# Patient Record
Sex: FEMALE | Race: BLACK OR AFRICAN AMERICAN | NOT HISPANIC OR LATINO | Employment: FULL TIME | URBAN - METROPOLITAN AREA
[De-identification: names, ages, dates, MRNs, and addresses within clinical notes are randomized per-mention and may not be internally consistent; named-entity substitution may affect disease eponyms.]

---
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
## 2019-04-08 ENCOUNTER — TRANSCRIBE ORDERS (OUTPATIENT)
Dept: ADMINISTRATIVE | Age: 19
End: 2019-04-08

## 2019-04-08 ENCOUNTER — APPOINTMENT (OUTPATIENT)
Dept: LAB | Age: 19
End: 2019-04-08
Attending: PREVENTIVE MEDICINE

## 2019-04-08 DIAGNOSIS — Z01.84 IMMUNITY STATUS TESTING: ICD-10-CM

## 2019-04-08 DIAGNOSIS — Z01.84 IMMUNITY STATUS TESTING: Primary | ICD-10-CM

## 2019-04-08 LAB — RUBV IGG SERPL IA-ACNC: 48.3 IU/ML

## 2019-04-08 PROCEDURE — 86765 RUBEOLA ANTIBODY: CPT

## 2019-04-08 PROCEDURE — 86480 TB TEST CELL IMMUN MEASURE: CPT

## 2019-04-08 PROCEDURE — 86735 MUMPS ANTIBODY: CPT

## 2019-04-08 PROCEDURE — 36415 COLL VENOUS BLD VENIPUNCTURE: CPT

## 2019-04-08 PROCEDURE — 86762 RUBELLA ANTIBODY: CPT

## 2019-04-08 PROCEDURE — 86787 VARICELLA-ZOSTER ANTIBODY: CPT

## 2019-04-09 LAB
MEV IGG SER QL: NORMAL
MUV IGG SER QL: NORMAL

## 2019-04-10 LAB
GAMMA INTERFERON BACKGROUND BLD IA-ACNC: 0.05 IU/ML
M TB IFN-G BLD-IMP: NEGATIVE
M TB IFN-G CD4+ BCKGRND COR BLD-ACNC: 0.01 IU/ML
M TB IFN-G CD4+ BCKGRND COR BLD-ACNC: 0.02 IU/ML
MITOGEN IGNF BCKGRD COR BLD-ACNC: >10 IU/ML

## 2019-04-11 LAB — VZV IGG SER IA-ACNC: ABNORMAL

## 2020-05-23 ENCOUNTER — HOSPITAL ENCOUNTER (EMERGENCY)
Facility: HOSPITAL | Age: 20
Discharge: HOME | End: 2020-05-23
Attending: EMERGENCY MEDICINE | Admitting: EMERGENCY MEDICINE
Payer: COMMERCIAL

## 2020-05-23 VITALS
OXYGEN SATURATION: 99 % | RESPIRATION RATE: 20 BRPM | SYSTOLIC BLOOD PRESSURE: 123 MMHG | WEIGHT: 170 LBS | BODY MASS INDEX: 30.12 KG/M2 | TEMPERATURE: 98.2 F | HEIGHT: 63 IN | DIASTOLIC BLOOD PRESSURE: 72 MMHG | HEART RATE: 84 BPM

## 2020-05-23 DIAGNOSIS — R10.11 RIGHT UPPER QUADRANT PAIN: ICD-10-CM
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
## 2020-05-23 DIAGNOSIS — Z34.90 PREGNANCY: ICD-10-CM

## 2020-05-23 LAB
BACTERIA UR QL AUTO: ABNORMAL /HPF
BILIRUB UR QL STRIP: NEGATIVE
CLARITY UR: CLEAR
COLOR UR: YELLOW
COLOR, POC: NORMAL
EXT PREG TEST URINE: POSITIVE
EXT. CONTROL ED NAV: ABNORMAL
GLUCOSE UR STRIP-MCNC: NEGATIVE MG/DL
HGB UR QL STRIP.AUTO: ABNORMAL
HYALINE CASTS #/AREA URNS LPF: ABNORMAL /LPF
KETONES UR STRIP-MCNC: ABNORMAL MG/DL
LEUKOCYTE ESTERASE UR QL STRIP: ABNORMAL
NITRITE UR QL STRIP: NEGATIVE
NON-SQ EPI CELLS URNS QL MICRO: ABNORMAL /HPF
PH UR STRIP.AUTO: 6.5 [PH] (ref 4.5–8)
PROT UR STRIP-MCNC: ABNORMAL MG/DL
RBC #/AREA URNS AUTO: ABNORMAL /HPF
SP GR UR STRIP.AUTO: 1.02 (ref 1–1.03)
UROBILINOGEN UR QL STRIP.AUTO: 0.2 E.U./DL
WBC #/AREA URNS AUTO: ABNORMAL /HPF

## 2020-05-23 PROCEDURE — 81001 URINALYSIS AUTO W/SCOPE: CPT

## 2020-05-23 PROCEDURE — 99283 EMERGENCY DEPT VISIT LOW MDM: CPT

## 2020-05-23 PROCEDURE — 99284 EMERGENCY DEPT VISIT MOD MDM: CPT | Performed by: EMERGENCY MEDICINE

## 2020-05-23 PROCEDURE — 87086 URINE CULTURE/COLONY COUNT: CPT

## 2020-05-23 PROCEDURE — 81025 URINE PREGNANCY TEST: CPT | Performed by: EMERGENCY MEDICINE

## 2020-05-23 RX ORDER — ACETAMINOPHEN 500 MG
500 TABLET ORAL EVERY 6 HOURS PRN
Qty: 30 TABLET | Refills: 0 | Status: SHIPPED | OUTPATIENT
Start: 2020-05-23

## 2020-05-24 LAB — BACTERIA UR CULT: NORMAL

## 2020-05-25 ENCOUNTER — HOSPITAL ENCOUNTER (EMERGENCY)
Facility: HOSPITAL | Age: 20
Discharge: HOME | End: 2020-05-25
Attending: EMERGENCY MEDICINE
Payer: COMMERCIAL

## 2020-05-25 VITALS
HEART RATE: 63 BPM | DIASTOLIC BLOOD PRESSURE: 70 MMHG | BODY MASS INDEX: 31 KG/M2 | WEIGHT: 175 LBS | TEMPERATURE: 97.8 F | OXYGEN SATURATION: 99 % | RESPIRATION RATE: 14 BRPM | SYSTOLIC BLOOD PRESSURE: 146 MMHG

## 2020-05-25 DIAGNOSIS — R82.81 PYURIA: Primary | ICD-10-CM

## 2020-05-25 LAB
BACTERIA UR QL AUTO: ABNORMAL /HPF
BILIRUB UR QL STRIP: NEGATIVE
CLARITY UR: CLEAR
COLOR UR: YELLOW
COLOR, POC: NORMAL
GLUCOSE UR STRIP-MCNC: NEGATIVE MG/DL
HGB UR QL STRIP.AUTO: NEGATIVE
HYALINE CASTS #/AREA URNS LPF: ABNORMAL /LPF
KETONES UR STRIP-MCNC: NEGATIVE MG/DL
LEUKOCYTE ESTERASE UR QL STRIP: ABNORMAL
NITRITE UR QL STRIP: NEGATIVE
NON-SQ EPI CELLS URNS QL MICRO: ABNORMAL /HPF
PH UR STRIP.AUTO: 7 [PH] (ref 4.5–8)
PROT UR STRIP-MCNC: ABNORMAL MG/DL
RBC #/AREA URNS AUTO: ABNORMAL /HPF
SP GR UR STRIP.AUTO: 1.02 (ref 1–1.03)
UROBILINOGEN UR QL STRIP.AUTO: 0.2 E.U./DL
WBC #/AREA URNS AUTO: ABNORMAL /HPF

## 2020-05-25 PROCEDURE — 81001 URINALYSIS AUTO W/SCOPE: CPT

## 2020-05-25 PROCEDURE — 99283 EMERGENCY DEPT VISIT LOW MDM: CPT

## 2020-05-25 PROCEDURE — 87086 URINE CULTURE/COLONY COUNT: CPT

## 2020-05-25 PROCEDURE — 99284 EMERGENCY DEPT VISIT MOD MDM: CPT | Performed by: EMERGENCY MEDICINE

## 2020-05-26 LAB — BACTERIA UR CULT: NORMAL

## 2023-07-26 ENCOUNTER — APPOINTMENT (EMERGENCY)
Dept: RADIOLOGY | Facility: HOSPITAL | Age: 23
End: 2023-07-26
Payer: MEDICARE

## 2023-07-26 ENCOUNTER — HOSPITAL ENCOUNTER (EMERGENCY)
Facility: HOSPITAL | Age: 23
Discharge: HOME | End: 2023-07-26
Attending: EMERGENCY MEDICINE
Payer: MEDICARE

## 2023-07-26 VITALS
HEART RATE: 99 BPM | RESPIRATION RATE: 18 BRPM | SYSTOLIC BLOOD PRESSURE: 128 MMHG | DIASTOLIC BLOOD PRESSURE: 92 MMHG | OXYGEN SATURATION: 95 % | TEMPERATURE: 97.8 F

## 2023-07-26 DIAGNOSIS — S93.402A LEFT ANKLE SPRAIN: Primary | ICD-10-CM

## 2023-07-26 PROCEDURE — 73610 X-RAY EXAM OF ANKLE: CPT

## 2023-07-26 PROCEDURE — 73630 X-RAY EXAM OF FOOT: CPT

## 2023-07-26 RX ORDER — OMEPRAZOLE 20 MG/1
20 CAPSULE, DELAYED RELEASE ORAL 2 TIMES DAILY
COMMUNITY
Start: 2023-01-31 | End: 2024-01-31

## 2023-07-26 RX ORDER — ACETAMINOPHEN 325 MG/1
650 TABLET ORAL ONCE
Status: COMPLETED | OUTPATIENT
Start: 2023-07-26 | End: 2023-07-26

## 2023-07-26 RX ORDER — IBUPROFEN 600 MG/1
600 TABLET ORAL ONCE
Status: COMPLETED | OUTPATIENT
Start: 2023-07-26 | End: 2023-07-26

## 2023-07-26 RX ADMIN — IBUPROFEN 600 MG: 600 TABLET, FILM COATED ORAL at 14:37

## 2023-07-26 RX ADMIN — ACETAMINOPHEN 650 MG: 325 TABLET ORAL at 14:37

## 2023-07-26 NOTE — ED PROVIDER NOTES
History  Chief Complaint   Patient presents with   • Ankle Injury     Was walking and left ankle gave out and started swelling now having pain. HPI  Patient is a 23-year-old female presenting with ankle injury. States that she was walking when she accidentally tripped and landed on her left ankle inverted. States that she is able to walk but with difficulty. There is mild swelling reported by patient. States that her toes feel a little tingly but denies any numbness. Is able to mobilize her toes without difficulty. When she fell denies any head strike or loss of consciousness. Denies any other injuries. Other than the ankle pain has no other sites of pain including knee or foot. Prior to Admission Medications   Prescriptions Last Dose Informant Patient Reported? Taking? fluvoxaMINE (LUVOX) 100 mg tablet   Yes No   Sig: Take 200 mg by mouth daily. guanFACINE (TENEX) 2 MG tablet   Yes No   Sig: Take 2 mg by mouth daily. lisdexamfetamine (VYVANSE) 50 MG capsule  Self Yes No   Sig: Take 50 mg by mouth every morning   omeprazole (PriLOSEC) 20 mg delayed release capsule   Yes Yes   Sig: Take 20 mg by mouth 2 (two) times a day      Facility-Administered Medications: None       Past Medical History:   Diagnosis Date   • ADHD (attention deficit hyperactivity disorder)    • Fainting spell    • Low blood pressure    • Migraine        No past surgical history on file. No family history on file. I have reviewed and agree with the history as documented. E-Cigarette/Vaping     E-Cigarette/Vaping Substances     Social History     Tobacco Use   • Smoking status: Never   • Smokeless tobacco: Never   Substance Use Topics   • Alcohol use: No   • Drug use: No       Review of Systems   Constitutional: Negative for chills, diaphoresis, fever and unexpected weight change. HENT: Negative for ear pain and sore throat. Eyes: Negative for visual disturbance.    Respiratory: Negative for cough, chest tightness and shortness of breath. Cardiovascular: Negative for chest pain and leg swelling. Gastrointestinal: Negative for abdominal distention, abdominal pain, constipation, diarrhea, nausea and vomiting. Endocrine: Negative. Genitourinary: Negative for difficulty urinating and dysuria. Musculoskeletal: Positive for arthralgias. Skin: Negative. Allergic/Immunologic: Negative. Neurological: Negative. Hematological: Negative. Psychiatric/Behavioral: Negative. All other systems reviewed and are negative. Physical Exam  Physical Exam  Vitals and nursing note reviewed. Constitutional:       General: She is not in acute distress. Appearance: Normal appearance. She is not ill-appearing. HENT:      Head: Normocephalic and atraumatic. Right Ear: External ear normal.      Left Ear: External ear normal.      Nose: Nose normal.      Mouth/Throat:      Mouth: Mucous membranes are moist.      Pharynx: Oropharynx is clear. Eyes:      General: No scleral icterus. Right eye: No discharge. Left eye: No discharge. Extraocular Movements: Extraocular movements intact. Conjunctiva/sclera: Conjunctivae normal.      Pupils: Pupils are equal, round, and reactive to light. Cardiovascular:      Rate and Rhythm: Normal rate and regular rhythm. Pulses: Normal pulses. Heart sounds: Normal heart sounds. Pulmonary:      Effort: Pulmonary effort is normal.      Breath sounds: Normal breath sounds. Abdominal:      General: Abdomen is flat. Bowel sounds are normal. There is no distension. Palpations: Abdomen is soft. Tenderness: There is no abdominal tenderness. There is no guarding or rebound. Musculoskeletal:         General: Tenderness (Left lateral malleolus) present. Normal range of motion. Cervical back: Normal range of motion and neck supple. Skin:     General: Skin is warm and dry.       Capillary Refill: Capillary refill takes less than 2 seconds. Neurological:      General: No focal deficit present. Mental Status: She is alert and oriented to person, place, and time. Mental status is at baseline. Psychiatric:         Mood and Affect: Mood normal.         Behavior: Behavior normal.         Thought Content: Thought content normal.         Judgment: Judgment normal.         Vital Signs  ED Triage Vitals   Temperature Pulse Respirations Blood Pressure SpO2   07/26/23 1345 07/26/23 1345 07/26/23 1345 07/26/23 1345 07/26/23 1345   97.8 °F (36.6 °C) 99 18 128/92 95 %      Temp Source Heart Rate Source Patient Position - Orthostatic VS BP Location FiO2 (%)   07/26/23 1345 07/26/23 1345 07/26/23 1345 07/26/23 1345 --   Tympanic Monitor Sitting Left arm       Pain Score       07/26/23 1437       10 - Worst Possible Pain           Vitals:    07/26/23 1345   BP: 128/92   Pulse: 99   Patient Position - Orthostatic VS: Sitting         Visual Acuity      ED Medications  Medications   acetaminophen (TYLENOL) tablet 650 mg (650 mg Oral Given 7/26/23 1437)   ibuprofen (MOTRIN) tablet 600 mg (600 mg Oral Given 7/26/23 1437)       Diagnostic Studies  Results Reviewed     None                 XR ankle 3+ views LEFT   Final Result by Lindsay Reynoso MD (07/26 1414)      No acute osseous abnormality. Workstation performed: [id number]         XR foot 3+ views LEFT    (Results Pending)              Procedures  Procedures         ED Course                               SBIRT 22yo+    Flowsheet Row Most Recent Value   Initial Alcohol Screen: US AUDIT-C     1. How often do you have a drink containing alcohol? 0 Filed at: 07/26/2023 1410   2. How many drinks containing alcohol do you have on a typical day you are drinking? 0 Filed at: 07/26/2023 1410   3a. Male UNDER 65: How often do you have five or more drinks on one occasion? 0 Filed at: 07/26/2023 1410   3b. FEMALE Any Age, or MALE 65+:  How often do you have 4 or more drinks on one occassion? 0 Filed at: 07/26/2023 1410   Audit-C Score 0 Filed at: 07/26/2023 1410   JUAN CARLOS: How many times in the past year have you. .. Used an illegal drug or used a prescription medication for non-medical reasons? Never Filed at: 07/26/2023 1410                    Medical Decision Making   48-year-old female presenting with left-sided ankle pain. On x-ray no signs of dislocation or fracture  Most likely due to ankle sprain  We will discharge with [employer] as well as crutches  Return precautions provided    Left ankle sprain: acute illness or injury  Risk  OTC drugs. Prescription drug management. Disposition  Final diagnoses:   Left ankle sprain     Time reflects when diagnosis was documented in both MDM as applicable and the Disposition within this note     Time User Action Codes Description Comment    7/26/2023  2:34 PM Eliazar Marshall Left ankle sprain       ED Disposition     ED Disposition   Discharge    Condition   Stable    Date/Time   Wed Jul 26, 2023  2:33 PM    [address] [place] [address] discharge to home/self care. Follow-up Information     Follow up With Specialties Details Why Contact Info Additional Information    Regina Fleming DO Pediatrics Schedule an appointment as soon as possible for a visit   [address] [facility] [phone] 8[address] [facility] Emergency Department Emergency Medicine Go to  If symptoms worsen [address] [phone] 245[address] [facility] Emergency Department          Discharge Medication List as of 7/26/2023  2:34 PM      CONTINUE these medications which have NOT CHANGED    Details   omeprazole (PriLOSEC) 20 mg delayed release capsule Take 20 mg by mouth 2 (two) times a day, Starting Tue 1/31/2023, Until Wed 1/31/2024, Historical Med      fluvoxaMINE (LUVOX) 100 mg tablet Take 200 mg by mouth daily. , Until Discontinued, Historical Med guanFACINE (TENEX) 2 MG tablet Take 2 mg by mouth daily. , Until Discontinued, Historical Med      lisdexamfetamine (VYVANSE) 50 MG capsule Take 50 mg by mouth every morning, Historical Med             No discharge procedures on file.     PDMP Review     None          ED Provider  Electronically Signed by           Walter Ham MD  07/26/23 7550

## 2023-08-31 ENCOUNTER — HOSPITAL ENCOUNTER (EMERGENCY)
Facility: HOSPITAL | Age: 23
Discharge: HOME | End: 2023-08-31
Attending: EMERGENCY MEDICINE
Payer: MEDICARE

## 2023-08-31 VITALS
TEMPERATURE: 98.3 F | RESPIRATION RATE: 18 BRPM | OXYGEN SATURATION: 97 % | HEART RATE: 74 BPM | SYSTOLIC BLOOD PRESSURE: 127 MMHG | DIASTOLIC BLOOD PRESSURE: 69 MMHG

## 2023-08-31 DIAGNOSIS — T31.0 BURN (ANY DEGREE) INVOLVING LESS THAN 10% OF BODY SURFACE: ICD-10-CM

## 2023-08-31 DIAGNOSIS — T21.24XA PARTIAL THICKNESS BURN OF BACK, INITIAL ENCOUNTER: Primary | ICD-10-CM

## 2023-08-31 PROCEDURE — 99282 EMERGENCY DEPT VISIT SF MDM: CPT

## 2023-08-31 PROCEDURE — 99284 EMERGENCY DEPT VISIT MOD MDM: CPT | Performed by: EMERGENCY MEDICINE

## 2023-08-31 RX ORDER — ACETAMINOPHEN 325 MG/1
975 TABLET ORAL ONCE
Status: COMPLETED | OUTPATIENT
Start: 2023-08-31 | End: 2023-08-31

## 2023-08-31 RX ORDER — IBUPROFEN 400 MG/1
400 TABLET ORAL ONCE
Status: COMPLETED | OUTPATIENT
Start: 2023-08-31 | End: 2023-08-31

## 2023-08-31 RX ORDER — LIDOCAINE 40 MG/G
CREAM TOPICAL AS NEEDED
Qty: 30 G | Refills: 0 | Status: SHIPPED | OUTPATIENT
Start: 2023-08-31

## 2023-08-31 RX ORDER — LIDOCAINE 40 MG/G
CREAM TOPICAL ONCE
Status: COMPLETED | OUTPATIENT
Start: 2023-08-31 | End: 2023-08-31

## 2023-08-31 RX ADMIN — ACETAMINOPHEN 975 MG: 325 TABLET, FILM COATED ORAL at 23:04

## 2023-08-31 RX ADMIN — LIDOCAINE 4%: 4 CREAM TOPICAL at 23:04

## 2023-08-31 RX ADMIN — IBUPROFEN 400 MG: 400 TABLET, FILM COATED ORAL at 23:04

## 2023-08-31 NOTE — Clinical Note
Radha Casas was seen and treated in our emergency department on 8/31/2023. Diagnosis:     Harika Menendez  may return to work on return date. She may return on this date: 09/02/2023         If you have any questions or concerns, please don't hesitate to call.       Anastacia Kessler,     ______________________________           _______________          _______________  Hospital Representative                              Date                                Time
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
## 2024-08-22 ENCOUNTER — HOSPITAL ENCOUNTER (EMERGENCY)
Facility: HOSPITAL | Age: 24
Discharge: HOME | End: 2024-08-22
Attending: EMERGENCY MEDICINE

## 2024-08-22 VITALS
OXYGEN SATURATION: 99 % | HEART RATE: 74 BPM | BODY MASS INDEX: 38.46 KG/M2 | RESPIRATION RATE: 18 BRPM | TEMPERATURE: 98.3 F | SYSTOLIC BLOOD PRESSURE: 128 MMHG | WEIGHT: 217.1 LBS | DIASTOLIC BLOOD PRESSURE: 74 MMHG

## 2024-08-22 DIAGNOSIS — R19.7 DIARRHEA: ICD-10-CM

## 2024-08-22 DIAGNOSIS — N39.0 UTI (URINARY TRACT INFECTION): Primary | ICD-10-CM

## 2024-08-22 LAB
BACTERIA UR QL AUTO: ABNORMAL /HPF
BILIRUB UR QL STRIP: NEGATIVE
CLARITY UR: ABNORMAL
COLOR UR: YELLOW
EXT PREGNANCY TEST URINE: NEGATIVE
EXT. CONTROL: NORMAL
GLUCOSE UR STRIP-MCNC: NEGATIVE MG/DL
HGB UR QL STRIP.AUTO: 25
KETONES UR STRIP-MCNC: NEGATIVE MG/DL
LEUKOCYTE ESTERASE UR QL STRIP: 500
MUCOUS THREADS UR QL AUTO: ABNORMAL
NITRITE UR QL STRIP: NEGATIVE
NON-SQ EPI CELLS URNS QL MICRO: ABNORMAL /HPF
PH UR STRIP.AUTO: 5 [PH]
PROT UR STRIP-MCNC: ABNORMAL MG/DL
RBC #/AREA URNS AUTO: ABNORMAL /HPF
SP GR UR STRIP.AUTO: 1.02 (ref 1–1.04)
UROBILINOGEN UA: NEGATIVE MG/DL
WBC #/AREA URNS AUTO: ABNORMAL /HPF

## 2024-08-22 PROCEDURE — 99283 EMERGENCY DEPT VISIT LOW MDM: CPT

## 2024-08-22 PROCEDURE — 99284 EMERGENCY DEPT VISIT MOD MDM: CPT | Performed by: EMERGENCY MEDICINE

## 2024-08-22 PROCEDURE — 81003 URINALYSIS AUTO W/O SCOPE: CPT | Performed by: EMERGENCY MEDICINE

## 2024-08-22 PROCEDURE — 81025 URINE PREGNANCY TEST: CPT | Performed by: EMERGENCY MEDICINE

## 2024-08-22 PROCEDURE — 87086 URINE CULTURE/COLONY COUNT: CPT | Performed by: EMERGENCY MEDICINE

## 2024-08-22 PROCEDURE — 81001 URINALYSIS AUTO W/SCOPE: CPT | Performed by: EMERGENCY MEDICINE

## 2024-08-22 RX ORDER — DICYCLOMINE HCL 20 MG
20 TABLET ORAL ONCE
Status: COMPLETED | OUTPATIENT
Start: 2024-08-22 | End: 2024-08-22

## 2024-08-22 RX ORDER — DOXYCYCLINE 100 MG/1
100 CAPSULE ORAL 2 TIMES DAILY
Qty: 20 CAPSULE | Refills: 0 | Status: SHIPPED | OUTPATIENT
Start: 2024-08-22 | End: 2024-08-22 | Stop reason: CLARIF

## 2024-08-22 RX ORDER — DOXYCYCLINE 100 MG/1
100 CAPSULE ORAL 2 TIMES DAILY
Qty: 20 CAPSULE | Refills: 0 | OUTPATIENT
Start: 2024-08-22 | End: 2024-08-22

## 2024-08-22 RX ORDER — NITROFURANTOIN 25; 75 MG/1; MG/1
100 CAPSULE ORAL 2 TIMES DAILY
Qty: 10 CAPSULE | Refills: 0 | Status: SHIPPED | OUTPATIENT
Start: 2024-08-22

## 2024-08-22 RX ORDER — METRONIDAZOLE 500 MG/1
500 TABLET ORAL ONCE
Status: DISCONTINUED | OUTPATIENT
Start: 2024-08-22 | End: 2024-08-22

## 2024-08-22 RX ORDER — DOXYCYCLINE 100 MG/1
100 CAPSULE ORAL ONCE
Status: DISCONTINUED | OUTPATIENT
Start: 2024-08-22 | End: 2024-08-22

## 2024-08-22 RX ORDER — NITROFURANTOIN 25; 75 MG/1; MG/1
100 CAPSULE ORAL ONCE
Status: DISCONTINUED | OUTPATIENT
Start: 2024-08-22 | End: 2024-08-22 | Stop reason: HOSPADM

## 2024-08-22 RX ORDER — METRONIDAZOLE 500 MG/1
500 TABLET ORAL EVERY 12 HOURS SCHEDULED
Qty: 20 TABLET | Refills: 0 | Status: SHIPPED | OUTPATIENT
Start: 2024-08-22 | End: 2024-08-22 | Stop reason: CLARIF

## 2024-08-22 RX ORDER — LOPERAMIDE HCL 2 MG
2 CAPSULE ORAL ONCE
Status: COMPLETED | OUTPATIENT
Start: 2024-08-22 | End: 2024-08-22

## 2024-08-22 RX ORDER — METRONIDAZOLE 500 MG/1
500 TABLET ORAL EVERY 12 HOURS SCHEDULED
Qty: 20 TABLET | Refills: 0 | OUTPATIENT
Start: 2024-08-22 | End: 2024-08-22

## 2024-08-22 RX ADMIN — LOPERAMIDE HYDROCHLORIDE 2 MG: 2 CAPSULE ORAL at 10:07

## 2024-08-22 RX ADMIN — DICYCLOMINE HYDROCHLORIDE 20 MG: 20 TABLET ORAL at 10:18

## 2024-08-22 NOTE — ED NOTES
Provided resources on applying for MA through PATHS at the hospital, and also she is requesting resources for counseling - provided [facility]

## 2024-08-22 NOTE — Clinical Note
Carlos Choi was seen and treated in our emergency department on 8/22/2024.                Diagnosis:     Carlos  may return to school on return date.    She may return on this date: 08/23/2024         If you have any questions or concerns, please don't hesitate to call.      Miguel Angel Carmona MD    ______________________________           _______________          _______________  Hospital Representative                              Date                                Time

## 2024-08-22 NOTE — ED PROVIDER NOTES
"History  Chief Complaint   Patient presents with    Diarrhea     Watery stool x 5 days    Pregnancy Test     Pt had a light period last month. \"But normally it last longer and its heavy\"     24 y/o female w/a PMH of GERD and anxiety presents to the ED with a one week history of watery diarrhea. She states she has about 8 episodes of the diareha per day. Associated symptoms include RLQ pain for the past two days, which she describes as sharp shooting pains and 5/10 pain severity. She has had decreased appetite since the diarrhea started. Her last bowel movement was this morning and was loose and watery. Last menstrual period was a month ago lasting 2 days and lighter bleeding then usual. She had a normal pap smear and normal STD testing done on 6/12/24. She denies any fever, chills, nausea, sore throat, cough, palpitations, chest pain, shortness of breath, vomiting, urinary symptoms, vaginal discharge, vaginal bleeding, or skin changes.       Diarrhea  Associated symptoms: no abdominal pain, no chills, no fever, no headaches, no myalgias and no vomiting    Pregnancy Test  Associated symptoms: diarrhea    Associated symptoms: no abdominal pain, no chest pain, no cough, no fatigue, no fever, no headaches, no myalgias, no nausea, no shortness of breath, no sore throat, no vomiting and no wheezing        Prior to Admission Medications   Prescriptions Last Dose Informant Patient Reported? Taking?   fluvoxaMINE (LUVOX) 100 mg tablet   Yes No   Sig: Take 200 mg by mouth daily.   guanFACINE (TENEX) 2 MG tablet   Yes No   Sig: Take 2 mg by mouth daily.   lidocaine (LMX) 4 % cream   No No   Sig: Apply topically as needed for mild pain   lisdexamfetamine (VYVANSE) 50 MG capsule  Self Yes No   Sig: Take 50 mg by mouth every morning      Facility-Administered Medications: None       Past Medical History:   Diagnosis Date    ADHD (attention deficit hyperactivity disorder)     Fainting spell     Low blood pressure     Migraine  "       History reviewed. No pertinent surgical history.    History reviewed. No pertinent family history.  I have reviewed and agree with the history as documented.    E-Cigarette/Vaping     E-Cigarette/Vaping Substances     Social History     Tobacco Use    Smoking status: Never    Smokeless tobacco: Never   Substance Use Topics    Alcohol use: No    Drug use: No        Review of Systems   Constitutional:  Negative for chills, fatigue and fever.   HENT:  Negative for sore throat.    Respiratory:  Negative for cough, choking, chest tightness, shortness of breath and wheezing.    Cardiovascular:  Negative for chest pain, palpitations and leg swelling.   Gastrointestinal:  Positive for diarrhea. Negative for abdominal pain, nausea and vomiting.   Genitourinary:  Negative for dyspareunia, flank pain, frequency, pelvic pain, urgency, vaginal bleeding, vaginal discharge and vaginal pain.   Musculoskeletal:  Negative for back pain, gait problem, joint swelling and myalgias.   Skin: Negative.    Neurological:  Negative for seizures, weakness, light-headedness, numbness and headaches.   Psychiatric/Behavioral: Negative.         Physical Exam  ED Triage Vitals [08/22/24 0917]   Temperature Pulse Respirations Blood Pressure SpO2   98.3 °F (36.8 °C) 74 18 128/74 99 %      Temp Source Heart Rate Source Patient Position - Orthostatic VS BP Location FiO2 (%)   Oral Monitor Sitting Left arm --      Pain Score       No Pain             Orthostatic Vital Signs  Vitals:    08/22/24 0917   BP: 128/74   Pulse: 74   Patient Position - Orthostatic VS: Sitting       Physical Exam  Vitals and nursing note reviewed.   Constitutional:       Appearance: Normal appearance. She is normal weight.   HENT:      Right Ear: External ear normal.      Left Ear: External ear normal.      Nose: Nose normal.      Mouth/Throat:      Mouth: Mucous membranes are moist.      Pharynx: Oropharynx is clear. No oropharyngeal exudate or posterior oropharyngeal  erythema.   Eyes:      General: No scleral icterus.        Right eye: No discharge.         Left eye: No discharge.      Extraocular Movements: Extraocular movements intact.      Pupils: Pupils are equal, round, and reactive to light.   Cardiovascular:      Rate and Rhythm: Normal rate and regular rhythm.      Pulses: Normal pulses.      Heart sounds: Normal heart sounds. No murmur heard.  Pulmonary:      Effort: Pulmonary effort is normal. No respiratory distress.      Breath sounds: Normal breath sounds. No stridor. No wheezing, rhonchi or rales.   Chest:      Chest wall: No tenderness.   Abdominal:      General: Abdomen is flat.      Palpations: Abdomen is soft.      Tenderness: There is abdominal tenderness (Mild RLQ pain to deep palpation).   Genitourinary:     Comments: Deferred    Musculoskeletal:         General: No swelling or tenderness. Normal range of motion.      Cervical back: Normal range of motion. No tenderness.      Right lower leg: No edema.      Left lower leg: No edema.   Skin:     General: Skin is warm.      Capillary Refill: Capillary refill takes less than 2 seconds.      Coloration: Skin is not jaundiced or pale.      Findings: No rash.   Neurological:      General: No focal deficit present.      Mental Status: She is alert and oriented to person, place, and time. Mental status is at baseline.      Cranial Nerves: No cranial nerve deficit.      Sensory: No sensory deficit.   Psychiatric:         Mood and Affect: Mood normal.         Behavior: Behavior normal.         Thought Content: Thought content normal.         Judgment: Judgment normal.         ED Medications  Medications   loperamide (IMODIUM) capsule 2 mg (2 mg Oral Given 8/22/24 1007)   dicyclomine (BENTYL) tablet 20 mg (20 mg Oral Given 8/22/24 1018)       Diagnostic Studies  Results Reviewed       Procedure Component Value Units Date/Time    Urine Microscopic [[id number]]  (Abnormal) Collected: 08/22/24 6393    Lab Status: Final  result Specimen: Urine, Clean Catch Updated: 08/22/24 1016     RBC, UA 0-1 /hpf      WBC, UA 20-30 /hpf      Epithelial Cells Moderate /hpf      Bacteria, UA Innumerable /hpf      MUCUS THREADS Moderate    Urine culture [[id number]] Collected: 08/22/24 0951    Lab Status: In process Specimen: Urine, Clean Catch Updated: 08/22/24 1015    UA w Reflex to Microscopic w Reflex to Culture [[id number]]  (Abnormal) Collected: 08/22/24 0951    Lab Status: Final result Specimen: Urine, Clean Catch Updated: 08/22/24 1005     Color, UA Yellow     Clarity, UA Slightly Cloudy     Specific Gravity, UA 1.020     pH, UA 5.0     Leukocytes, .0     Nitrite, UA Negative     Protein, UA 15 (Trace) mg/dl      Glucose, UA Negative mg/dl      Ketones, UA Negative mg/dl      Bilirubin, UA Negative     Occult Blood, UA 25.0     UROBILINOGEN UA Negative mg/dL     POCT pregnancy, urine [[id number]]  (Normal) Resulted: 08/22/24 0924    Lab Status: Final result Updated: 08/22/24 0924     EXT Preg Test, Ur Negative     Control Valid                   No orders to display         Procedures  Procedures      ED Course                                       Medical Decision Making  The patient's diarrhea is liekly to due a viral gastroenteritis, given lack of other significant findings and normal vital signs. Due to her lower abdominal pain the patient was worked up w/Urinalysis and for pregnancy. Her pregnancy test was negative. The urinalysis is concerning for UTI. The patient was provided supportive treatment in the ED and she had no other acute complaints. She was stable at the time of discharge. Patient was prescribed medication for outpatient treatment of her UTI and advised to follow up with her PCP. Patient was advised ED return precautions and was amenable to the plan.     Amount and/or Complexity of Data Reviewed  Labs: ordered.    Risk  Prescription drug management.          Disposition  Final diagnoses:   UTI (urinary tract infection)    Diarrhea     Time reflects when diagnosis was documented in both MDM as applicable and the Disposition within this note       Time User Action Codes Description Comment    8/22/2024 10:05 AM Fazli, Wolfut Add [N73.9] PID (pelvic inflammatory disease)     8/22/2024 10:11 AM Fazli, Wolfut Add [E11.9] Type 2 diabetes mellitus (HCC)     8/22/2024 10:11 AM Fazli, Wolfut Modify [N73.9] PID (pelvic inflammatory disease)     8/22/2024 10:11 AM Fazli, Wolfut Modify [E11.9] Type 2 diabetes mellitus (HCC)     8/22/2024 10:11 AM Fazli, Wolfut Modify [N73.9] PID (pelvic inflammatory disease)     8/22/2024 10:11 AM Fazli, Wolfut Modify [E11.9] Type 2 diabetes mellitus (HCC)     8/22/2024 10:16 AM Fazli, Wolfut Modify [E11.9] Type 2 diabetes mellitus (HCC)     8/22/2024 10:16 AM Fazli, Wolfut Remove [N73.9] PID (pelvic inflammatory disease)     8/22/2024 10:16 AM Fazli, Wolfut Remove [E11.9] Type 2 diabetes mellitus (HCC)     8/22/2024 10:25 AM Sadler, Virat Add [N39.0] UTI (urinary tract infection)     8/22/2024 10:25 AM Sadler, Virat Add [R19.7] Diarrhea           ED Disposition       ED Disposition   Discharge    Condition   Stable    Date/Time   Thu Aug 22, 2024 10:26 AM    Comment   Carlos Choi discharge to home/self care.                   Follow-up Information       Follow up With Specialties Details Why Contact Info    Inga Fischer DO Pediatrics   [address],  [address]  [facility] [address] [phone]              Discharge Medication List as of 8/22/2024 10:26 AM        START taking these medications    Details   nitrofurantoin (MACROBID) 100 mg capsule Take 1 capsule (100 mg total) by mouth 2 (two) times a day, Starting Thu 8/22/2024, Normal           CONTINUE these medications which have NOT CHANGED    Details   fluvoxaMINE (LUVOX) 100 mg tablet Take 200 mg by mouth daily., Until Discontinued, Historical Med      guanFACINE (TENEX) 2 MG tablet Take 2 mg by mouth daily., Until  Discontinued, Historical Med      lidocaine (LMX) 4 % cream Apply topically as needed for mild pain, Starting u 8/31/2023, Normal      lisdexamfetamine (VYVANSE) 50 MG capsule Take 50 mg by mouth every morning, Historical Med           STOP taking these medications       doxycycline hyclate (VIBRAMYCIN) 100 mg capsule Comments:   Reason for Stopping:         metFORMIN (GLUCOPHAGE) 500 mg tablet Comments:   Reason for Stopping:         metroNIDAZOLE (FLAGYL) 500 mg tablet Comments:   Reason for Stopping:             No discharge procedures on file.    PDMP Review       None             ED Provider  Attending physically available and evaluated Carlos Choi. I managed the patient along with the ED Attending.    Electronically Signed by           Miguel Angel Carmona MD  08/22/24 8376

## 2024-08-23 LAB — BACTERIA UR CULT: NORMAL

## 2024-08-27 ENCOUNTER — HOSPITAL ENCOUNTER (EMERGENCY)
Facility: HOSPITAL | Age: 24
Discharge: HOME | End: 2024-08-27
Attending: EMERGENCY MEDICINE | Admitting: EMERGENCY MEDICINE

## 2024-08-27 VITALS
SYSTOLIC BLOOD PRESSURE: 123 MMHG | WEIGHT: 214 LBS | TEMPERATURE: 98.4 F | RESPIRATION RATE: 18 BRPM | DIASTOLIC BLOOD PRESSURE: 74 MMHG | BODY MASS INDEX: 37.91 KG/M2 | OXYGEN SATURATION: 97 % | HEART RATE: 72 BPM

## 2024-08-27 DIAGNOSIS — R10.9 LEFT FLANK PAIN: Primary | ICD-10-CM

## 2024-08-27 LAB
BACTERIA UR QL AUTO: ABNORMAL /HPF
BILIRUB UR QL STRIP: NEGATIVE
CLARITY UR: CLEAR
COLOR UR: YELLOW
GLUCOSE UR STRIP-MCNC: NEGATIVE MG/DL
HGB UR QL STRIP.AUTO: 50
KETONES UR STRIP-MCNC: NEGATIVE MG/DL
LEUKOCYTE ESTERASE UR QL STRIP: NEGATIVE
MUCOUS THREADS UR QL AUTO: ABNORMAL
NITRITE UR QL STRIP: NEGATIVE
NON-SQ EPI CELLS URNS QL MICRO: ABNORMAL /HPF
PH UR STRIP.AUTO: 7 [PH]
PROT UR STRIP-MCNC: NEGATIVE MG/DL
RBC #/AREA URNS AUTO: ABNORMAL /HPF
SP GR UR STRIP.AUTO: 1.01 (ref 1–1.04)
UROBILINOGEN UA: 1 MG/DL
WBC #/AREA URNS AUTO: ABNORMAL /HPF

## 2024-08-27 PROCEDURE — 99284 EMERGENCY DEPT VISIT MOD MDM: CPT | Performed by: EMERGENCY MEDICINE

## 2024-08-27 PROCEDURE — 99282 EMERGENCY DEPT VISIT SF MDM: CPT

## 2024-08-27 PROCEDURE — 81001 URINALYSIS AUTO W/SCOPE: CPT | Performed by: EMERGENCY MEDICINE

## 2024-08-27 RX ORDER — IBUPROFEN 600 MG/1
600 TABLET, FILM COATED ORAL ONCE
Status: COMPLETED | OUTPATIENT
Start: 2024-08-27 | End: 2024-08-27

## 2024-08-27 RX ORDER — ACETAMINOPHEN 325 MG/1
975 TABLET ORAL ONCE
Status: COMPLETED | OUTPATIENT
Start: 2024-08-27 | End: 2024-08-27

## 2024-08-27 RX ADMIN — IBUPROFEN 600 MG: 600 TABLET, FILM COATED ORAL at 02:11

## 2024-08-27 RX ADMIN — ACETAMINOPHEN 975 MG: 325 TABLET ORAL at 02:11

## 2024-08-27 NOTE — ED PROVIDER NOTES
"History  Chief Complaint   Patient presents with    Rash     Noticed \"rash\" on top of right breast a couple of days ago and now feels she has a rash under her right breast. No itching.      HPI  23-year-old female presenting with request for skin evaluation as well as left flank pain.  Patient states that she was recently diagnosed with a UTI but has not been compliant with her antibiotics and is concerned that he may have not been completely treated.  Patient denies any dysuria or hematuria.  Patient was concerned because she started noticing some mild peeling of the skin of the right breast.  Denies any pain or pruritus.  Patient without any fever, chills, nausea, vomiting, diarrhea.  Patient has not taken any pain medication.  Reports no other complaints.    Prior to Admission Medications   Prescriptions Last Dose Informant Patient Reported? Taking?   fluvoxaMINE (LUVOX) 100 mg tablet   Yes No   Sig: Take 200 mg by mouth daily.   guanFACINE (TENEX) 2 MG tablet   Yes No   Sig: Take 2 mg by mouth daily.   lidocaine (LMX) 4 % cream   No No   Sig: Apply topically as needed for mild pain   lisdexamfetamine (VYVANSE) 50 MG capsule  Self Yes No   Sig: Take 50 mg by mouth every morning   nitrofurantoin (MACROBID) 100 mg capsule   No No   Sig: Take 1 capsule (100 mg total) by mouth 2 (two) times a day      Facility-Administered Medications: None       Past Medical History:   Diagnosis Date    ADHD (attention deficit hyperactivity disorder)     Fainting spell     Low blood pressure     Migraine        History reviewed. No pertinent surgical history.    History reviewed. No pertinent family history.  I have reviewed and agree with the history as documented.    E-Cigarette/Vaping     E-Cigarette/Vaping Substances     Social History     Tobacco Use    Smoking status: Never    Smokeless tobacco: Never   Substance Use Topics    Alcohol use: No    Drug use: No       Review of Systems   Constitutional:  Negative for chills, " diaphoresis, fever and unexpected weight change.   HENT:  Negative for ear pain and sore throat.    Eyes:  Negative for visual disturbance.   Respiratory:  Negative for cough, chest tightness and shortness of breath.    Cardiovascular:  Negative for chest pain and leg swelling.   Gastrointestinal:  Negative for abdominal distention, abdominal pain, constipation, diarrhea, nausea and vomiting.   Endocrine: Negative.    Genitourinary:  Positive for flank pain. Negative for difficulty urinating and dysuria.   Skin: Negative.    Allergic/Immunologic: Negative.    Neurological: Negative.    Hematological: Negative.    Psychiatric/Behavioral: Negative.     All other systems reviewed and are negative.      Physical Exam  Physical Exam  Vitals and nursing note reviewed.   Constitutional:       General: She is not in acute distress.     Appearance: Normal appearance. She is not ill-appearing.   HENT:      Head: Normocephalic and atraumatic.      Right Ear: External ear normal.      Left Ear: External ear normal.      Nose: Nose normal.      Mouth/Throat:      Mouth: Mucous membranes are moist.      Pharynx: Oropharynx is clear.   Eyes:      General: No scleral icterus.        Right eye: No discharge.         Left eye: No discharge.      Extraocular Movements: Extraocular movements intact.      Conjunctiva/sclera: Conjunctivae normal.      Pupils: Pupils are equal, round, and reactive to light.   Cardiovascular:      Rate and Rhythm: Normal rate and regular rhythm.      Pulses: Normal pulses.      Heart sounds: Normal heart sounds.   Pulmonary:      Effort: Pulmonary effort is normal.      Breath sounds: Normal breath sounds.   Abdominal:      General: Abdomen is flat. Bowel sounds are normal. There is no distension.      Palpations: Abdomen is soft.      Tenderness: There is no abdominal tenderness. There is left CVA tenderness. There is no guarding or rebound.   Musculoskeletal:         General: Normal range of motion.       Cervical back: Normal range of motion and neck supple.   Skin:     General: Skin is warm and dry.      Capillary Refill: Capillary refill takes less than 2 seconds.      Comments: No significant skin changes noted on the right breast   Neurological:      General: No focal deficit present.      Mental Status: She is alert and oriented to person, place, and time. Mental status is at baseline.   Psychiatric:         Mood and Affect: Mood normal.         Behavior: Behavior normal.         Thought Content: Thought content normal.         Judgment: Judgment normal.         Vital Signs  ED Triage Vitals [08/27/24 0107]   Temperature Pulse Respirations Blood Pressure SpO2   98.4 °F (36.9 °C) 72 18 123/74 97 %      Temp Source Heart Rate Source Patient Position - Orthostatic VS BP Location FiO2 (%)   Oral Monitor Sitting Left arm --      Pain Score       --           Vitals:    08/27/24 0107   BP: 123/74   Pulse: 72   Patient Position - Orthostatic VS: Sitting         Visual Acuity      ED Medications  Medications   acetaminophen (TYLENOL) tablet 975 mg (975 mg Oral Given 8/27/24 0211)   ibuprofen (MOTRIN) tablet 600 mg (600 mg Oral Given 8/27/24 0211)       Diagnostic Studies  Results Reviewed       Procedure Component Value Units Date/Time    Urine Microscopic [[id number]]  (Abnormal) Collected: 08/27/24 0143    Lab Status: Final result Specimen: Urine, Clean Catch Updated: 08/27/24 0205     RBC, UA 2-4 /hpf      WBC, UA 0-1 /hpf      Epithelial Cells Occasional /hpf      Bacteria, UA Occasional /hpf      MUCUS THREADS Occasional    UA (URINE) with reflex to Scope [[id number]]  (Abnormal) Collected: 08/27/24 0143    Lab Status: Final result Specimen: Urine, Clean Catch Updated: 08/27/24 0155     Color, UA Yellow     Clarity, UA Clear     Specific Gravity, UA 1.015     pH, UA 7.0     Leukocytes, UA Negative     Nitrite, UA Negative     Protein, UA Negative mg/dl      Glucose, UA Negative mg/dl      Ketones, UA Negative mg/dl       Bilirubin, UA Negative     Occult Blood, UA 50.0     UROBILINOGEN UA 1.0 mg/dL                    No orders to display              Procedures  Procedures         ED Course                                 SBIRT 22yo+      Flowsheet Row Most Recent Value   Initial Alcohol Screen: US AUDIT-C     1. How often do you have a drink containing alcohol? 0 Filed at: 08/27/2024 0109   2. How many drinks containing alcohol do you have on a typical day you are drinking?  0 Filed at: 08/27/2024 0109   3a. Male UNDER 65: How often do you have five or more drinks on one occasion? 0 Filed at: 08/27/2024 0109   3b. FEMALE Any Age, or MALE 65+: How often do you have 4 or more drinks on one occassion? 0 Filed at: 08/27/2024 0109   Audit-C Score 0 Filed at: 08/27/2024 0109   JUAN CARLOS: How many times in the past year have you...    Used an illegal drug or used a prescription medication for non-medical reasons? Never Filed at: 08/27/2024 0109                      Medical Decision Making  23-year-old female presents with left flank pain and also request for skin evaluation  On exam patient without any significant skin changes on the right breast despite patient's complaint that the skin is peeling off  Patient with flank pain and with recent diagnosis of UTI will assess for pyelonephritis  Urinalysis is unremarkable and not concerning for UTI  Most likely muscular pain  Analgesia was given with complete resolution of symptoms  Patient discharged with instruction to follow-up with PCP    Problems Addressed:  Left flank pain: acute illness or injury    Amount and/or Complexity of Data Reviewed  Labs: ordered.    Risk  OTC drugs.  Prescription drug management.                 Disposition  Final diagnoses:   Left flank pain     Time reflects when diagnosis was documented in both MDM as applicable and the Disposition within this note       Time User Action Codes Description Comment    8/27/2024  2:03 AM Davey Marshall Add [R10.9] Left flank pain            ED Disposition       ED Disposition   Discharge    Condition   Stable    Date/Time   Tue Aug 27, 2024 0203    Comment   Carlos Choi discharge to home/self care.                   Follow-up Information       Follow up With Specialties Details Why Contact Info Additional Information    [facility] Emergency Medicine Go to  If symptoms worsen [address] [facility] [address] [phone] [facility]    Inga Fischer DO Pediatrics Schedule an appointment as soon as possible for a visit   [facility]  [facility],  [address]  [facility] [id number] [phone]               Discharge Medication List as of 8/27/2024  2:04 AM        CONTINUE these medications which have NOT CHANGED    Details   fluvoxaMINE (LUVOX) 100 mg tablet Take 200 mg by mouth daily., Until Discontinued, Historical Med      guanFACINE (TENEX) 2 MG tablet Take 2 mg by mouth daily., Until Discontinued, Historical Med      lidocaine (LMX) 4 % cream Apply topically as needed for mild pain, Starting Thu 8/31/2023, Normal      lisdexamfetamine (VYVANSE) 50 MG capsule Take 50 mg by mouth every morning, Historical Med      nitrofurantoin (MACROBID) 100 mg capsule Take 1 capsule (100 mg total) by mouth 2 (two) times a day, Starting Thu 8/22/2024, Normal             No discharge procedures on file.    PDMP Review       None            ED Provider  Electronically Signed by             Davey Marshall MD  08/27/24 0532

## 2024-09-14 ENCOUNTER — HOSPITAL ENCOUNTER (EMERGENCY)
Facility: HOSPITAL | Age: 24
Discharge: HOME | End: 2024-09-14
Attending: EMERGENCY MEDICINE

## 2024-09-14 VITALS
DIASTOLIC BLOOD PRESSURE: 90 MMHG | OXYGEN SATURATION: 99 % | BODY MASS INDEX: 37.38 KG/M2 | HEART RATE: 87 BPM | WEIGHT: 211 LBS | SYSTOLIC BLOOD PRESSURE: 148 MMHG | RESPIRATION RATE: 18 BRPM | TEMPERATURE: 97.9 F

## 2024-09-14 DIAGNOSIS — R21 RASH AND NONSPECIFIC SKIN ERUPTION: Primary | ICD-10-CM

## 2024-09-14 PROCEDURE — 99284 EMERGENCY DEPT VISIT MOD MDM: CPT

## 2024-09-14 PROCEDURE — 99282 EMERGENCY DEPT VISIT SF MDM: CPT

## 2024-09-14 RX ORDER — DIPHENHYDRAMINE HCL 25 MG
25 TABLET ORAL ONCE
Status: COMPLETED | OUTPATIENT
Start: 2024-09-14 | End: 2024-09-14

## 2024-09-14 RX ORDER — TRIAMCINOLONE ACETONIDE 1 MG/G
CREAM TOPICAL 2 TIMES DAILY
Qty: 30 G | Refills: 0 | Status: SHIPPED | OUTPATIENT
Start: 2024-09-14

## 2024-09-14 RX ADMIN — DIPHENHYDRAMINE HYDROCHLORIDE 25 MG: 25 TABLET ORAL at 06:34

## 2024-09-14 NOTE — ED PROVIDER NOTES
"1. Rash and nonspecific skin eruption      ED Disposition       ED Disposition   Discharge    Condition   Stable    Date/Time   Sat Sep 14, 2024  6:31 AM    Comment   Carlos Choi discharge to home/self care.                   Assessment & Plan           Medical Decision Making  Patient presents for evaluation of a rash on her right breast.  Differential diagnosis includes but is not limited to contact dermatitis, allergic dermatitis, urticaria, eczema, or candidal skin infection; doubt malignancy.  Physical exam is most consistent with dermatitis.  Will prescribe a topical steroid and refer to dermatology for further evaluation.  Patient is in agreement with the treatment plan and all questions were answered.  Return precautions discussed and she verbalized understanding.  Follow-up with PCP and dermatology, but return to the ED in the interim with new or worsening symptoms.    Problems Addressed:  Rash and nonspecific skin eruption: acute illness or injury    Risk  OTC drugs.  Prescription drug management.         Medications   diphenhydrAMINE (BENADRYL) tablet 25 mg (25 mg Oral Given 9/14/24 0634)       Chief Complaint   Patient presents with    Rash     Pt reports a rash on R breast that she was seen for appr. 1 week ago. States it came back last night and is causing a burning sensation.  Was told on prior visit that it was dermatitis.        History of Present Illness       The patient is a 23-year-old female with a past medical history of migraines and ADHD, who presents for evaluation of a rash.  She reports developing a rash on her right breast over two weeks ago.  The rash resolved on it's own however last night the patient began to experience an itching sensation \"like hives\" underneath her right breast.  Shortly PTA she awoke from sleep with a burning sensation on her right breast and when she looked she noticed a rash.  No associated fevers, nipple discharge, or nipple inversion.  The patient does " report wearing a tight shirt with glitter/sequins on it, and is not sure if this caused the rash.  No new lotions, soaps, or laundry detergents.  Patient was adopted and therefore does not know if there is a family history of breast cancer.          Review of Systems   Constitutional:  Negative for chills and fever.   HENT:  Negative for ear pain and sore throat.    Eyes:  Negative for pain and visual disturbance.   Respiratory:  Negative for cough and shortness of breath.    Cardiovascular:  Negative for chest pain and palpitations.   Gastrointestinal:  Negative for abdominal pain and vomiting.   Genitourinary:  Negative for dysuria and hematuria.   Musculoskeletal:  Negative for arthralgias, back pain and myalgias.   Skin:  Positive for rash. Negative for color change and wound.   Neurological:  Negative for seizures and syncope.   All other systems reviewed and are negative.      Objective     ED Triage Vitals   Temperature Pulse Blood Pressure Respirations SpO2 Patient Position - Orthostatic VS   09/14/24 0608 09/14/24 0608 09/14/24 0608 09/14/24 0620 09/14/24 0608 09/14/24 0608   97.9 °F (36.6 °C) 87 148/90 18 99 % Sitting      Temp Source Heart Rate Source BP Location FiO2 (%) Pain Score    09/14/24 0608 09/14/24 0608 09/14/24 0608 -- 09/14/24 0608    Oral Monitor Left arm  7        Physical Exam  Vitals and nursing note reviewed. Exam conducted with a chaperone present (Libby NUNEZ RN).   Constitutional:       General: She is awake. She is not in acute distress.     Appearance: Normal appearance. She is well-developed and overweight. She is not toxic-appearing or diaphoretic.   HENT:      Head: Normocephalic and atraumatic.      Right Ear: External ear normal.      Left Ear: External ear normal.      Nose: Nose normal.      Mouth/Throat:      Lips: Pink.      Mouth: Mucous membranes are moist.   Eyes:      General: Lids are normal. Vision grossly intact. Gaze aligned appropriately.      Conjunctiva/sclera:  Conjunctivae normal.      Pupils: Pupils are equal, round, and reactive to light.   Cardiovascular:      Rate and Rhythm: Normal rate and regular rhythm.   Pulmonary:      Effort: Pulmonary effort is normal. No respiratory distress.   Chest:   Breasts:     Breasts are symmetrical.      Right: Tenderness present. No swelling, bleeding, inverted nipple, nipple discharge or skin change.      Left: Normal.      Comments: Pinpoint blanchable erythematous macules on the right breast.  No change in skin texture or dimpling.  Musculoskeletal:      Cervical back: Normal, full passive range of motion without pain and neck supple.      Thoracic back: Normal.      Lumbar back: Normal.   Skin:     General: Skin is warm.      Capillary Refill: Capillary refill takes less than 2 seconds.      Coloration: Skin is not pale.      Findings: Rash present.   Neurological:      Mental Status: She is alert and oriented to person, place, and time.   Psychiatric:         Attention and Perception: Attention normal.         Mood and Affect: Mood normal.         Speech: Speech normal.         Behavior: Behavior normal. Behavior is cooperative.         Labs Reviewed - No data to display  No orders to display       Procedures       Sruthi Grande PA-C  09/14/24 0740

## 2024-09-17 ENCOUNTER — HOSPITAL ENCOUNTER (EMERGENCY)
Facility: HOSPITAL | Age: 24
Discharge: HOME | End: 2024-09-17
Attending: EMERGENCY MEDICINE

## 2024-09-17 VITALS
TEMPERATURE: 98.4 F | OXYGEN SATURATION: 98 % | WEIGHT: 214.95 LBS | DIASTOLIC BLOOD PRESSURE: 86 MMHG | BODY MASS INDEX: 38.08 KG/M2 | HEART RATE: 88 BPM | SYSTOLIC BLOOD PRESSURE: 139 MMHG | RESPIRATION RATE: 20 BRPM

## 2024-09-17 DIAGNOSIS — R21 RASH AND NONSPECIFIC SKIN ERUPTION: Primary | ICD-10-CM

## 2024-09-17 PROCEDURE — 99284 EMERGENCY DEPT VISIT MOD MDM: CPT | Performed by: PHYSICIAN ASSISTANT

## 2024-09-17 PROCEDURE — 99282 EMERGENCY DEPT VISIT SF MDM: CPT

## 2024-09-17 NOTE — ED PROVIDER NOTES
"1. Rash and nonspecific skin eruption      ED Disposition       ED Disposition   Discharge    Condition   Stable    Date/Time   Tue Sep 17, 2024  8:37 AM    Comment   Carlos Choi discharge to home/self care.                   Assessment & Plan       Medical Decision Making  23-year-old female presenting for evaluation of a rash for which she has seen multiple providers and trialed triamcinolone cream, she reports her rash began 4 days ago when she was wearing a very tight fitting sequined top and the rash is noted to areas where the top would be tight fitting, she denies itching and reports it is a burning sensation.  She denies fevers or chills and reports no other associated symptoms or new medications, rash does not have the appearance consistent with a petechial/vascular rash, nor is the rash solely dermatomal or vesicular such as an shingles, additionally the rash does not have target lesion appearance for consistency with Lyme disease nor is it sloughing such as with a drug rash.  High suspicion for chafing/abrasion of the skin due to tight fitting clothing particularly in the setting of distribution of the rash consistent with bra strap and tight fitting top.  Patient was advised to discontinue the use of any medications, avoid antibiotic creams steroid creams or fungal creams and solely provide moisturizer to the area until she is able to follow-up with a dermatologist.  Multiple dermatology offices provided for follow-up.  No indication for further imaging, blood work or other testing at this time.    Strict return to ED precautions discussed. Patient recommended to follow up promptly with appropriate outpatient provider.Patient and/or family members verbalizes understanding and agrees with plan. Patient is stable for discharge.     Portions of the record may have been created with voice recognition software. Occasional wrong word or \"sound a like\" substitutions may have occurred due to the inherent " limitations of voice recognition software. Read the chart carefully and recognize, using context, where substitutions have occurred.                   Medications - No data to display    History of Present Illness       Patient is a 23-year-old female presents today for evaluation of a rash which is irritating and burning noted to her chest/breast area and shoulder and into her back.  She denies any fevers chills or other associated symptoms.  She reports she was seen here recently for the rash and was prescribed triamcinolone cream which did not alleviate her symptoms and in fact made the rash more burning.  She reports she did contact a family care provider has an appointment for Thursday and presented to an urgent care due to the burning she states urgent care told her she had shingles however she is concerned as her rash crosses the midline and is on both upper and lower parts of her chest.      Rash  Associated symptoms: no abdominal pain, no fatigue, no fever, no nausea, no shortness of breath, no sore throat and not vomiting          Review of Systems   Constitutional:  Negative for chills, fatigue and fever.   HENT:  Negative for congestion, ear pain, rhinorrhea and sore throat.    Eyes:  Negative for redness.   Respiratory:  Negative for chest tightness and shortness of breath.    Cardiovascular:  Negative for chest pain and palpitations.   Gastrointestinal:  Negative for abdominal pain, nausea and vomiting.   Genitourinary:  Negative for dysuria and hematuria.   Musculoskeletal: Negative.    Skin:  Positive for rash.   Neurological:  Negative for dizziness, syncope, light-headedness and numbness.           Objective     ED Triage Vitals [09/17/24 0816]   Temperature Pulse Blood Pressure Respirations SpO2 Patient Position - Orthostatic VS   98.4 °F (36.9 °C) 88 139/86 20 98 % Sitting      Temp Source Heart Rate Source BP Location FiO2 (%) Pain Score    Oral -- Right arm -- --        Physical Exam  Vitals  and nursing note reviewed.   Constitutional:       Appearance: She is well-developed.   HENT:      Head: Normocephalic.   Eyes:      General: No scleral icterus.  Cardiovascular:      Rate and Rhythm: Normal rate and regular rhythm.   Pulmonary:      Effort: Pulmonary effort is normal.      Breath sounds: Normal breath sounds. No stridor.   Abdominal:      General: There is no distension.      Palpations: Abdomen is soft.      Tenderness: There is no abdominal tenderness.   Musculoskeletal:         General: Normal range of motion.   Skin:     General: Skin is warm and dry.      Capillary Refill: Capillary refill takes less than 2 seconds.             Comments: [place] RN at bedside for chaperone.  Erythematous, excoriated, clustered area of what appears to be abrasion/abraded area of skin in the area suspected above.  There is no sloughing, vesicles, dermatomal nature, target lesions noted.   Neurological:      Mental Status: She is alert and oriented to person, place, and time.   Psychiatric:         Mood and Affect: Mood and affect normal.         Labs Reviewed - No data to display  No orders to display       Procedures       Mily Gordon PA-C  09/17/24 0843

## 2024-09-17 NOTE — DISCHARGE INSTRUCTIONS
Please do not use any medicated ointments or creams on your rash please make sure you follow-up with a family care provider or dermatologist for further treatment and testing.  Should you experience chest pain, passing out, fevers or other severe concerns you may return to the emergency department.  Otherwise please make sure to call to set up an appointment with an outpatient office, phone numbers were provided in your paperwork.

## 2024-10-30 ENCOUNTER — HOSPITAL ENCOUNTER (EMERGENCY)
Facility: HOSPITAL | Age: 24
Discharge: HOME | End: 2024-10-30
Attending: EMERGENCY MEDICINE

## 2024-10-30 VITALS
OXYGEN SATURATION: 98 % | WEIGHT: 221.7 LBS | SYSTOLIC BLOOD PRESSURE: 118 MMHG | HEART RATE: 64 BPM | RESPIRATION RATE: 18 BRPM | DIASTOLIC BLOOD PRESSURE: 61 MMHG | TEMPERATURE: 97.9 F | BODY MASS INDEX: 39.27 KG/M2

## 2024-10-30 DIAGNOSIS — R42 DIZZINESS: Primary | ICD-10-CM

## 2024-10-30 LAB
ALBUMIN SERPL BCG-MCNC: 3.9 G/DL (ref 3.5–5)
ALP SERPL-CCNC: 73 U/L (ref 34–104)
ALT SERPL W P-5'-P-CCNC: 21 U/L (ref 7–52)
ANION GAP SERPL CALCULATED.3IONS-SCNC: 7 MMOL/L (ref 4–13)
AST SERPL W P-5'-P-CCNC: 17 U/L (ref 13–39)
BACTERIA UR QL AUTO: ABNORMAL /HPF
BASOPHILS # BLD AUTO: 0.02 THOUSANDS/ΜL (ref 0–0.1)
BASOPHILS NFR BLD AUTO: 0 % (ref 0–1)
BILIRUB SERPL-MCNC: 0.49 MG/DL (ref 0.2–1)
BILIRUB UR QL STRIP: NEGATIVE
BUN SERPL-MCNC: 14 MG/DL (ref 5–25)
CALCIUM SERPL-MCNC: 8.7 MG/DL (ref 8.4–10.2)
CHLORIDE SERPL-SCNC: 104 MMOL/L (ref 96–108)
CLARITY UR: CLEAR
CO2 SERPL-SCNC: 28 MMOL/L (ref 21–32)
COLOR UR: ABNORMAL
CREAT SERPL-MCNC: 0.74 MG/DL (ref 0.6–1.3)
EOSINOPHIL # BLD AUTO: 0.04 THOUSAND/ΜL (ref 0–0.61)
EOSINOPHIL NFR BLD AUTO: 1 % (ref 0–6)
ERYTHROCYTE [DISTWIDTH] IN BLOOD BY AUTOMATED COUNT: 14.4 % (ref 11.6–15.1)
EXT PREGNANCY TEST URINE: NEGATIVE
EXT. CONTROL: NORMAL
GFR SERPL CREATININE-BSD FRML MDRD: 113 ML/MIN/1.73SQ M
GLUCOSE SERPL-MCNC: 92 MG/DL (ref 65–140)
GLUCOSE UR STRIP-MCNC: NEGATIVE MG/DL
HCT VFR BLD AUTO: 37.2 % (ref 34.8–46.1)
HGB BLD-MCNC: 11.9 G/DL (ref 11.5–15.4)
HGB UR QL STRIP.AUTO: 10
IMM GRANULOCYTES # BLD AUTO: 0.01 THOUSAND/UL (ref 0–0.2)
IMM GRANULOCYTES NFR BLD AUTO: 0 % (ref 0–2)
KETONES UR STRIP-MCNC: NEGATIVE MG/DL
LEUKOCYTE ESTERASE UR QL STRIP: 500
LIPASE SERPL-CCNC: 7 U/L (ref 11–82)
LYMPHOCYTES # BLD AUTO: 2.75 THOUSANDS/ΜL (ref 0.6–4.47)
LYMPHOCYTES NFR BLD AUTO: 45 % (ref 14–44)
MCH RBC QN AUTO: 26.5 PG (ref 26.8–34.3)
MCHC RBC AUTO-ENTMCNC: 32 G/DL (ref 31.4–37.4)
MCV RBC AUTO: 83 FL (ref 82–98)
MONOCYTES # BLD AUTO: 0.5 THOUSAND/ΜL (ref 0.17–1.22)
MONOCYTES NFR BLD AUTO: 8 % (ref 4–12)
MUCOUS THREADS UR QL AUTO: ABNORMAL
NEUTROPHILS # BLD AUTO: 2.81 THOUSANDS/ΜL (ref 1.85–7.62)
NEUTS SEG NFR BLD AUTO: 46 % (ref 43–75)
NITRITE UR QL STRIP: NEGATIVE
NON-SQ EPI CELLS URNS QL MICRO: ABNORMAL /HPF
NRBC BLD AUTO-RTO: 0 /100 WBCS
PH UR STRIP.AUTO: 6.5 [PH]
PLATELET # BLD AUTO: 417 THOUSANDS/UL (ref 149–390)
PMV BLD AUTO: 9.2 FL (ref 8.9–12.7)
POTASSIUM SERPL-SCNC: 4.2 MMOL/L (ref 3.5–5.3)
PROT SERPL-MCNC: 6.9 G/DL (ref 6.4–8.4)
PROT UR STRIP-MCNC: ABNORMAL MG/DL
RBC # BLD AUTO: 4.49 MILLION/UL (ref 3.81–5.12)
RBC #/AREA URNS AUTO: ABNORMAL /HPF
SODIUM SERPL-SCNC: 139 MMOL/L (ref 135–147)
SP GR UR STRIP.AUTO: 1.01 (ref 1–1.04)
UROBILINOGEN UA: 1 MG/DL
WBC # BLD AUTO: 6.13 THOUSAND/UL (ref 4.31–10.16)
WBC #/AREA URNS AUTO: ABNORMAL /HPF

## 2024-10-30 PROCEDURE — 85025 COMPLETE CBC W/AUTO DIFF WBC: CPT | Performed by: PHYSICIAN ASSISTANT

## 2024-10-30 PROCEDURE — 81003 URINALYSIS AUTO W/O SCOPE: CPT | Performed by: PHYSICIAN ASSISTANT

## 2024-10-30 PROCEDURE — 83690 ASSAY OF LIPASE: CPT | Performed by: PHYSICIAN ASSISTANT

## 2024-10-30 PROCEDURE — 81001 URINALYSIS AUTO W/SCOPE: CPT | Performed by: PHYSICIAN ASSISTANT

## 2024-10-30 PROCEDURE — 80053 COMPREHEN METABOLIC PANEL: CPT | Performed by: PHYSICIAN ASSISTANT

## 2024-10-30 PROCEDURE — 99283 EMERGENCY DEPT VISIT LOW MDM: CPT

## 2024-10-30 PROCEDURE — 96374 THER/PROPH/DIAG INJ IV PUSH: CPT

## 2024-10-30 PROCEDURE — 36415 COLL VENOUS BLD VENIPUNCTURE: CPT | Performed by: PHYSICIAN ASSISTANT

## 2024-10-30 PROCEDURE — 81025 URINE PREGNANCY TEST: CPT | Performed by: PHYSICIAN ASSISTANT

## 2024-10-30 PROCEDURE — 99284 EMERGENCY DEPT VISIT MOD MDM: CPT | Performed by: PHYSICIAN ASSISTANT

## 2024-10-30 RX ORDER — ONDANSETRON 2 MG/ML
4 INJECTION INTRAMUSCULAR; INTRAVENOUS ONCE
Status: COMPLETED | OUTPATIENT
Start: 2024-10-30 | End: 2024-10-30

## 2024-10-30 RX ADMIN — ONDANSETRON 4 MG: 2 INJECTION INTRAMUSCULAR; INTRAVENOUS at 09:33

## 2024-10-30 RX ADMIN — SODIUM CHLORIDE 500 ML: 0.9 INJECTION, SOLUTION INTRAVENOUS at 10:46

## 2024-10-30 NOTE — Clinical Note
Carlos Choi was seen and treated in our emergency department on 10/30/2024.    No restrictions            Diagnosis:     Carlos  may return to work on return date.    She may return on this date: 11/01/2024         If you have any questions or concerns, please don't hesitate to call.      Darcy Rice PA-C    ______________________________           _______________          _______________  Hospital Representative                              Date                                Time

## 2024-10-30 NOTE — ED PROVIDER NOTES
Time reflects when diagnosis was documented in both MDM as applicable and the Disposition within this note       Time User Action Codes Description Comment    10/30/2024 10:34 AM Darcy Rice Add [R42] Dizziness           ED Disposition       ED Disposition   Discharge    Condition   Stable    Date/Time   Wed Oct 30, 2024 10:34 AM    Comment   Carlos WYATT Jimbo discharge to home/self care.                   Assessment & Plan       Medical Decision Making  Differential diagnosis includes but not limited to: Pregnancy, electrolyte abnormality, dehydration.  Dysrhythmia or CVA less likely.    Problems Addressed:  Dizziness: acute illness or injury    Amount and/or Complexity of Data Reviewed  Labs: ordered. Decision-making details documented in ED Course.    Risk  Prescription drug management.        ED Course as of 10/30/24 1441   Wed Oct 30, 2024   1042 Patient reevaluated at this time.  Dizzy after leaving to use the bathroom.  Requesting IV fluids.       Medications   ondansetron (ZOFRAN) injection 4 mg (4 mg Intravenous Given 10/30/24 0933)   sodium chloride 0.9 % bolus 500 mL (0 mL Intravenous Stopped 10/30/24 1116)       ED Risk Strat Scores                           SBIRT 22yo+      Flowsheet Row Most Recent Value   Initial Alcohol Screen: US AUDIT-C     1. How often do you have a drink containing alcohol? 0 Filed at: 10/30/2024 0913   2. How many drinks containing alcohol do you have on a typical day you are drinking?  0 Filed at: 10/30/2024 0913   3a. Male UNDER 65: How often do you have five or more drinks on one occasion? 0 Filed at: 10/30/2024 0913   3b. FEMALE Any Age, or MALE 65+: How often do you have 4 or more drinks on one occassion? 0 Filed at: 10/30/2024 0913   Audit-C Score 0 Filed at: 10/30/2024 0913   JUAN CARLOS: How many times in the past year have you...    Used an illegal drug or used a prescription medication for non-medical reasons? Never Filed at: 10/30/2024 0913                             History of Present Illness       Chief Complaint   Patient presents with    Dizziness     Woke up dizzy this AM and vomited x1. Reports dizziness is worse when eyes are closed.        Past Medical History:   Diagnosis Date    ADHD (attention deficit hyperactivity disorder)     Fainting spell     Low blood pressure     Migraine       History reviewed. No pertinent surgical history.   History reviewed. No pertinent family history.   Social History     Tobacco Use    Smoking status: Never    Smokeless tobacco: Never   Substance Use Topics    Alcohol use: No    Drug use: No      E-Cigarette/Vaping      E-Cigarette/Vaping Substances      I have reviewed and agree with the history as documented.     23-year-old female presents the emergency department with complaint of dizziness.  States that she woke up this morning and when she went to get out of bed she felt dizzy, as if the room were spinning.  Notes associated nausea at that time.  States that symptoms have been intermittent over the course of the day today but seem to be most prominent with change in position or getting up from a recurrent position.  Thought she could be dehydrated but when she tried to drink some water earlier she had vomited.  Also with concern for pregnancy.  Denies any headache, neck pain, chest pain, palpitations, or shortness of breath.  No abdominal discomfort noted.      History provided by:  Patient   used: No    Dizziness  Quality:  Lightheadedness  Severity:  Unable to specify  Timing:  Intermittent  Progression:  Waxing and waning  Chronicity:  New  Relieved by:  Being still  Worsened by:  Standing up  Associated symptoms: nausea    Associated symptoms: no blood in stool, no chest pain, no diarrhea, no headaches, no hearing loss, no palpitations, no shortness of breath, no syncope, no tinnitus, no vision changes, no vomiting and no weakness        Review of Systems   Constitutional:  Negative for activity  change, appetite change, chills and fever.   HENT:  Negative for congestion, dental problem, drooling, ear discharge, ear pain, hearing loss, mouth sores, nosebleeds, rhinorrhea, sore throat, tinnitus and trouble swallowing.    Eyes:  Negative for pain, discharge and itching.   Respiratory:  Negative for cough, chest tightness, shortness of breath and wheezing.    Cardiovascular:  Negative for chest pain, palpitations and syncope.   Gastrointestinal:  Positive for nausea. Negative for abdominal pain, blood in stool, constipation, diarrhea and vomiting.   Endocrine: Negative for cold intolerance and heat intolerance.   Genitourinary:  Negative for difficulty urinating, dysuria, flank pain, frequency and urgency.   Skin:  Negative for rash and wound.   Allergic/Immunologic: Negative for food allergies and immunocompromised state.   Neurological:  Positive for dizziness. Negative for seizures, syncope, weakness, numbness and headaches.   Psychiatric/Behavioral:  Negative for agitation, behavioral problems and confusion.            Objective       ED Triage Vitals   Temperature Pulse Blood Pressure Respirations SpO2 Patient Position - Orthostatic VS   10/30/24 0902 10/30/24 0902 10/30/24 0902 10/30/24 0902 10/30/24 0902 10/30/24 0902   97.9 °F (36.6 °C) 65 118/73 20 98 % Sitting      Temp Source Heart Rate Source BP Location FiO2 (%) Pain Score    10/30/24 0902 10/30/24 0902 10/30/24 0902 -- 10/30/24 1116    Oral Monitor Right arm  No Pain      Vitals      Date and Time Temp Pulse SpO2 Resp BP Pain Score FACES Pain Rating User   10/30/24 1116 -- 64 98 % 18 118/61 No Pain -- TW   10/30/24 0902 97.9 °F (36.6 °C) 65 98 % 20 118/73 -- -- JW            Physical Exam  Vitals and nursing note reviewed.   Constitutional:       General: She is not in acute distress.     Appearance: She is not diaphoretic.   HENT:      Head: Normocephalic and atraumatic.      Right Ear: External ear normal.      Left Ear: External ear normal.       Mouth/Throat:      Mouth: Mucous membranes are moist.      Pharynx: No oropharyngeal exudate.   Eyes:      Extraocular Movements: Extraocular movements intact.      Conjunctiva/sclera: Conjunctivae normal.      Pupils: Pupils are equal, round, and reactive to light.   Neck:      Vascular: No JVD.      Trachea: No tracheal deviation.   Cardiovascular:      Rate and Rhythm: Normal rate and regular rhythm.      Heart sounds: Normal heart sounds. No murmur heard.     No friction rub. No gallop.   Pulmonary:      Effort: Pulmonary effort is normal. No respiratory distress.      Breath sounds: Normal breath sounds. No wheezing or rales.   Chest:      Chest wall: No tenderness.   Abdominal:      General: Bowel sounds are normal. There is no distension.      Palpations: Abdomen is soft.      Tenderness: There is no abdominal tenderness. There is no guarding.   Musculoskeletal:         General: No tenderness or deformity. Normal range of motion.   Lymphadenopathy:      Cervical: No cervical adenopathy.   Skin:     General: Skin is warm and dry.      Findings: No erythema or rash.   Neurological:      General: No focal deficit present.      Mental Status: She is alert and oriented to person, place, and time.   Psychiatric:         Mood and Affect: Mood normal.         Behavior: Behavior normal.         Results Reviewed       Procedure Component Value Units Date/Time    Comprehensive metabolic panel [[id number]] Collected: 10/30/24 0932    Lab Status: Final result Specimen: Blood from Arm, Right Updated: 10/30/24 1012     Sodium 139 mmol/L      Potassium 4.2 mmol/L      Chloride 104 mmol/L      CO2 28 mmol/L      ANION GAP 7 mmol/L      BUN 14 mg/dL      Creatinine 0.74 mg/dL      Glucose 92 mg/dL      Calcium 8.7 mg/dL      AST 17 U/L      ALT 21 U/L      Alkaline Phosphatase 73 U/L      Total Protein 6.9 g/dL      Albumin 3.9 g/dL      Total Bilirubin 0.49 mg/dL      eGFR 113 ml/min/1.73sq m     Narrative:      National  Kidney Disease [employer] guidelines for Chronic Kidney Disease (CKD):     Stage 1 with normal or high GFR (GFR > 90 mL/min/1.73 square meters)    Stage 2 Mild CKD (GFR = 60-89 mL/min/1.73 square meters)    Stage 3A Moderate CKD (GFR = 45-59 mL/min/1.73 square meters)    Stage 3B Moderate CKD (GFR = 30-44 mL/min/1.73 square meters)    Stage 4 Severe CKD (GFR = 15-29 mL/min/1.73 square meters)    Stage 5 End Stage CKD (GFR <15 mL/min/1.73 square meters)  Note: GFR calculation is accurate only with a steady state creatinine    Lipase [[id number]]  (Abnormal) Collected: 10/30/24 0932    Lab Status: Final result Specimen: Blood from Arm, Right Updated: 10/30/24 1012     Lipase 7 u/L     Urine Microscopic [[id number]]  (Abnormal) Collected: 10/30/24 0932    Lab Status: Final result Specimen: Urine, Other Updated: 10/30/24 0959     RBC, UA 0-1 /hpf      WBC, UA 4-10 /hpf      Epithelial Cells Moderate /hpf      Bacteria, UA Moderate /hpf      MUCUS THREADS Moderate    UA w Reflex to Microscopic w Reflex to Culture [[id number]]  (Abnormal) Collected: 10/30/24 0932    Lab Status: Final result Specimen: Urine, Other Updated: 10/30/24 0942     Color, UA Doris     Clarity, UA Clear     Specific Gravity, UA 1.015     pH, UA 6.5     Leukocytes, .0     Nitrite, UA Negative     Protein, UA 15 (Trace) mg/dl      Glucose, UA Negative mg/dl      Ketones, UA Negative mg/dl      Bilirubin, UA Negative     Occult Blood, UA 10.0     UROBILINOGEN UA 1.0 mg/dL     CBC and differential [[id number]]  (Abnormal) Collected: 10/30/24 0932    Lab Status: Final result Specimen: Blood from Arm, Right Updated: 10/30/24 0938     WBC 6.13 Thousand/uL      RBC 4.49 Million/uL      Hemoglobin 11.9 g/dL      Hematocrit 37.2 %      MCV 83 fL      MCH 26.5 pg      MCHC 32.0 g/dL      RDW 14.4 %      MPV 9.2 fL      Platelets 417 Thousands/uL      nRBC 0 /100 WBCs      Segmented % 46 %      Immature Grans % 0 %      Lymphocytes % 45 %      Monocytes % 8  %      Eosinophils Relative 1 %      Basophils Relative 0 %      Absolute Neutrophils 2.81 Thousands/µL      Absolute Immature Grans 0.01 Thousand/uL      Absolute Lymphocytes 2.75 Thousands/µL      Absolute Monocytes 0.50 Thousand/µL      Eosinophils Absolute 0.04 Thousand/µL      Basophils Absolute 0.02 Thousands/µL     POCT pregnancy, urine [[id number]]  (Normal) Resulted: 10/30/24 0921    Lab Status: Final result Updated: 10/30/24 0921     EXT Preg Test, Ur Negative     Control Valid            No orders to display       Procedures    ED Medication and Procedure Management   Prior to Admission Medications   Prescriptions Last Dose Informant Patient Reported? Taking?   fluvoxaMINE (LUVOX) 100 mg tablet   Yes No   Sig: Take 200 mg by mouth daily.   guanFACINE (TENEX) 2 MG tablet   Yes No   Sig: Take 2 mg by mouth daily.   lidocaine (LMX) 4 % cream   No No   Sig: Apply topically as needed for mild pain   lisdexamfetamine (VYVANSE) 50 MG capsule  Self Yes No   Sig: Take 50 mg by mouth every morning   nitrofurantoin (MACROBID) 100 mg capsule   No No   Sig: Take 1 capsule (100 mg total) by mouth 2 (two) times a day   triamcinolone (KENALOG) 0.1 % cream   No No   Sig: Apply topically 2 (two) times a day      Facility-Administered Medications: None     Discharge Medication List as of 10/30/2024 10:34 AM        CONTINUE these medications which have NOT CHANGED    Details   fluvoxaMINE (LUVOX) 100 mg tablet Take 200 mg by mouth daily., Until Discontinued, Historical Med      guanFACINE (TENEX) 2 MG tablet Take 2 mg by mouth daily., Until Discontinued, Historical Med      lidocaine (LMX) 4 % cream Apply topically as needed for mild pain, Starting u 8/31/2023, Normal      lisdexamfetamine (VYVANSE) 50 MG capsule Take 50 mg by mouth every morning, Historical Med      nitrofurantoin (MACROBID) 100 mg capsule Take 1 capsule (100 mg total) by mouth 2 (two) times a day, Starting Thu 8/22/2024, Normal      triamcinolone  (KENALOG) 0.1 % cream Apply topically 2 (two) times a day, Starting Sat 9/14/2024, Normal           No discharge procedures on file.  ED SEPSIS DOCUMENTATION   Time reflects when diagnosis was documented in both MDM as applicable and the Disposition within this note       Time User Action Codes Description Comment    10/30/2024 10:34 AM Darcy Rice Add [R42] Dizziness                  Darcy Rice PA-C  10/30/24 0079

## 2024-11-13 ENCOUNTER — HOSPITAL ENCOUNTER (EMERGENCY)
Facility: HOSPITAL | Age: 24
Discharge: HOME | End: 2024-11-13
Attending: EMERGENCY MEDICINE

## 2024-11-13 VITALS
HEIGHT: 63 IN | SYSTOLIC BLOOD PRESSURE: 141 MMHG | WEIGHT: 222.66 LBS | HEART RATE: 70 BPM | BODY MASS INDEX: 39.45 KG/M2 | RESPIRATION RATE: 20 BRPM | TEMPERATURE: 98 F | DIASTOLIC BLOOD PRESSURE: 89 MMHG | OXYGEN SATURATION: 98 %

## 2024-11-13 DIAGNOSIS — H69.93 DYSFUNCTION OF BOTH EUSTACHIAN TUBES: Primary | ICD-10-CM

## 2024-11-13 PROCEDURE — 99283 EMERGENCY DEPT VISIT LOW MDM: CPT

## 2024-11-13 PROCEDURE — 99283 EMERGENCY DEPT VISIT LOW MDM: CPT | Performed by: EMERGENCY MEDICINE

## 2024-11-13 RX ORDER — PSEUDOEPHEDRINE HCL 30 MG/1
60 TABLET, FILM COATED ORAL ONCE
Status: COMPLETED | OUTPATIENT
Start: 2024-11-13 | End: 2024-11-13

## 2024-11-13 RX ORDER — LORATADINE 10 MG/1
10 TABLET ORAL ONCE
Status: COMPLETED | OUTPATIENT
Start: 2024-11-13 | End: 2024-11-13

## 2024-11-13 RX ORDER — ACETAMINOPHEN 325 MG/1
650 TABLET ORAL ONCE
Status: COMPLETED | OUTPATIENT
Start: 2024-11-13 | End: 2024-11-13

## 2024-11-13 RX ORDER — LORATADINE 10 MG/1
10 TABLET ORAL 2 TIMES DAILY
Qty: 10 TABLET | Refills: 0 | Status: SHIPPED | OUTPATIENT
Start: 2024-11-13 | End: 2024-11-18

## 2024-11-13 RX ADMIN — PSEUDOEPHEDRINE HCL 60 MG: 30 TABLET, FILM COATED ORAL at 12:28

## 2024-11-13 RX ADMIN — ACETAMINOPHEN 650 MG: 325 TABLET ORAL at 12:28

## 2024-11-13 RX ADMIN — LORATADINE 10 MG: 10 TABLET ORAL at 12:29
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
## 2025-03-11 ENCOUNTER — HOSPITAL ENCOUNTER (EMERGENCY)
Facility: HOSPITAL | Age: 25
Discharge: HOME | End: 2025-03-11
Attending: EMERGENCY MEDICINE | Admitting: EMERGENCY MEDICINE

## 2025-03-11 VITALS
HEART RATE: 74 BPM | WEIGHT: 211.2 LBS | RESPIRATION RATE: 18 BRPM | TEMPERATURE: 98.2 F | BODY MASS INDEX: 37.41 KG/M2 | OXYGEN SATURATION: 100 % | SYSTOLIC BLOOD PRESSURE: 139 MMHG | DIASTOLIC BLOOD PRESSURE: 78 MMHG

## 2025-03-11 DIAGNOSIS — H57.11 ACUTE RIGHT EYE PAIN: Primary | ICD-10-CM

## 2025-03-11 DIAGNOSIS — H53.8 BLURRY VISION: ICD-10-CM

## 2025-03-11 PROCEDURE — 99282 EMERGENCY DEPT VISIT SF MDM: CPT

## 2025-03-11 PROCEDURE — 99283 EMERGENCY DEPT VISIT LOW MDM: CPT | Performed by: EMERGENCY MEDICINE

## 2025-03-11 RX ORDER — TETRACAINE HYDROCHLORIDE 5 MG/ML
1 SOLUTION OPHTHALMIC ONCE
Status: DISCONTINUED | OUTPATIENT
Start: 2025-03-11 | End: 2025-03-11 | Stop reason: HOSPADM

## 2025-03-11 NOTE — ED PROVIDER NOTES
"Time reflects when diagnosis was documented in both MDM as applicable and the Disposition within this note       Time User Action Codes Description Comment    3/11/2025  6:48 PM Davey Marshall Add [H57.11] Acute right eye pain     3/11/2025  6:48 PM Davey Marshall [H53.8] Blurry vision           ED Disposition       ED Disposition   Discharge    Condition   Stable    Date/Time   Tue Mar 11, 2025  6:48 PM    Comment   Carlos Choi discharge to home/self care.                   Assessment & Plan       Medical Decision Making  24-year-old female presents with right-sided eye pain  No extraocular movement related pain  Not concerning for orbital cellulitis  Also no signs of conjunctivitis noted on examination  While attempting to stain the eye as well as applied tetracaine patient refused.  Risks were discussed however patient states that she would rather go to the eye doctor to get it done  Patient was discharged    Problems Addressed:  Acute right eye pain: acute illness or injury  Blurry vision: acute illness or injury             Medications - No data to display    ED Risk Strat Scores                                                History of Present Illness       Chief Complaint   Patient presents with    Eye Problem     R eye irritation \"started hurting when I step outside\" sensitive to light.         Past Medical History:   Diagnosis Date    ADHD (attention deficit hyperactivity disorder)     Fainting spell     Low blood pressure     Migraine       History reviewed. No pertinent surgical history.   History reviewed. No pertinent family history.   Social History     Tobacco Use    Smoking status: Never    Smokeless tobacco: Never   Substance Use Topics    Alcohol use: No    Drug use: No      E-Cigarette/Vaping      E-Cigarette/Vaping Substances      I have reviewed and agree with the history as documented.     HPI  24-year-old female presents with left-sided eye pain as well as blurriness.  Patient states that it " happened as soon as she left the house.  States that immediately when she was exposed to the sun she noticed the symptom.  States that her pain has somewhat subsided.  Blurry vision has also improved but is still there.  Denies any visual field deficits.    Review of Systems   Constitutional:  Negative for chills, diaphoresis, fever and unexpected weight change.   HENT:  Negative for ear pain and sore throat.    Eyes:  Positive for pain and visual disturbance.   Respiratory:  Negative for cough, chest tightness and shortness of breath.    Cardiovascular:  Negative for chest pain and leg swelling.   Gastrointestinal:  Negative for abdominal distention, abdominal pain, constipation, diarrhea, nausea and vomiting.   Endocrine: Negative.    Genitourinary:  Negative for difficulty urinating and dysuria.   Musculoskeletal: Negative.    Skin: Negative.    Allergic/Immunologic: Negative.    Neurological: Negative.    Hematological: Negative.    Psychiatric/Behavioral: Negative.     All other systems reviewed and are negative.          Objective       ED Triage Vitals [03/11/25 1839]   Temperature Pulse Blood Pressure Respirations SpO2 Patient Position - Orthostatic VS   98.2 °F (36.8 °C) 74 139/78 18 100 % --      Temp Source Heart Rate Source BP Location FiO2 (%) Pain Score    Oral Monitor Left arm -- --      Vitals      Date and Time Temp Pulse SpO2 Resp BP Pain Score FACES Pain Rating User   03/11/25 1839 98.2 °F (36.8 °C) 74 100 % 18 139/78 -- -- JN            Physical Exam  Vitals and nursing note reviewed.   Constitutional:       General: She is not in acute distress.     Appearance: Normal appearance. She is not ill-appearing.   HENT:      Head: Normocephalic and atraumatic.      Right Ear: External ear normal.      Left Ear: External ear normal.      Nose: Nose normal.      Mouth/Throat:      Mouth: Mucous membranes are moist.      Pharynx: Oropharynx is clear.   Eyes:      General: No scleral icterus.        Right  eye: No discharge.         Left eye: No discharge.      Extraocular Movements: Extraocular movements intact.      Conjunctiva/sclera: Conjunctivae normal.      Pupils: Pupils are equal, round, and reactive to light.   Cardiovascular:      Rate and Rhythm: Normal rate and regular rhythm.      Pulses: Normal pulses.      Heart sounds: Normal heart sounds.   Pulmonary:      Effort: Pulmonary effort is normal.      Breath sounds: Normal breath sounds.   Abdominal:      General: Abdomen is flat. Bowel sounds are normal. There is no distension.      Palpations: Abdomen is soft.      Tenderness: There is no abdominal tenderness. There is no guarding or rebound.   Musculoskeletal:         General: Normal range of motion.      Cervical back: Normal range of motion and neck supple.   Skin:     General: Skin is warm and dry.      Capillary Refill: Capillary refill takes less than 2 seconds.   Neurological:      General: No focal deficit present.      Mental Status: She is alert and oriented to person, place, and time. Mental status is at baseline.   Psychiatric:         Mood and Affect: Mood normal.         Behavior: Behavior normal.         Thought Content: Thought content normal.         Judgment: Judgment normal.         Results Reviewed       None            No orders to display       Procedures    ED Medication and Procedure Management   Prior to Admission Medications   Prescriptions Last Dose Informant Patient Reported? Taking?   fluvoxaMINE (LUVOX) 100 mg tablet   Yes No   Sig: Take 200 mg by mouth daily.   guanFACINE (TENEX) 2 MG tablet   Yes No   Sig: Take 2 mg by mouth daily.   lidocaine (LMX) 4 % cream   No No   Sig: Apply topically as needed for mild pain   lisdexamfetamine (VYVANSE) 50 MG capsule  Self Yes No   Sig: Take 50 mg by mouth every morning   loratadine (CLARITIN) 10 mg tablet   No No   Sig: Take 1 tablet (10 mg total) by mouth 2 (two) times a day for 5 days   nitrofurantoin (MACROBID) 100 mg capsule   No  No   Sig: Take 1 capsule (100 mg total) by mouth 2 (two) times a day   triamcinolone (KENALOG) 0.1 % cream   No No   Sig: Apply topically 2 (two) times a day      Facility-Administered Medications: None     Discharge Medication List as of 3/11/2025  6:48 PM        CONTINUE these medications which have NOT CHANGED    Details   fluvoxaMINE (LUVOX) 100 mg tablet Take 200 mg by mouth daily., Until Discontinued, Historical Med      guanFACINE (TENEX) 2 MG tablet Take 2 mg by mouth daily., Until Discontinued, Historical Med      lidocaine (LMX) 4 % cream Apply topically as needed for mild pain, Starting Thu 8/31/2023, Normal      lisdexamfetamine (VYVANSE) 50 MG capsule Take 50 mg by mouth every morning, Historical Med      loratadine (CLARITIN) 10 mg tablet Take 1 tablet (10 mg total) by mouth 2 (two) times a day for 5 days, Starting Wed 11/13/2024, Until Mon 11/18/2024, Normal      nitrofurantoin (MACROBID) 100 mg capsule Take 1 capsule (100 mg total) by mouth 2 (two) times a day, Starting Thu 8/22/2024, Normal      triamcinolone (KENALOG) 0.1 % cream Apply topically 2 (two) times a day, Starting Sat 9/14/2024, Normal           No discharge procedures on file.  ED SEPSIS DOCUMENTATION   Time reflects when diagnosis was documented in both MDM as applicable and the Disposition within this note       Time User Action Codes Description Comment    3/11/2025  6:48 PM Davey Marshall [H57.11] Acute right eye pain     3/11/2025  6:48 PM Davey Marshall [H53.8] Blurry vision                  Davey Marshall MD  03/12/25 4624

## 2025-03-27 ENCOUNTER — HOSPITAL ENCOUNTER (EMERGENCY)
Facility: HOSPITAL | Age: 25
Discharge: HOME | End: 2025-03-27
Attending: EMERGENCY MEDICINE

## 2025-03-27 VITALS
OXYGEN SATURATION: 99 % | DIASTOLIC BLOOD PRESSURE: 79 MMHG | RESPIRATION RATE: 20 BRPM | SYSTOLIC BLOOD PRESSURE: 142 MMHG | WEIGHT: 213.63 LBS | BODY MASS INDEX: 37.84 KG/M2 | HEART RATE: 100 BPM | TEMPERATURE: 97.8 F

## 2025-03-27 DIAGNOSIS — J02.9 PHARYNGITIS: Primary | ICD-10-CM

## 2025-03-27 LAB
FLUAV AG UPPER RESP QL IA.RAPID: NEGATIVE
FLUBV AG UPPER RESP QL IA.RAPID: NEGATIVE
S PYO DNA THROAT QL NAA+PROBE: NOT DETECTED
SARS-COV+SARS-COV-2 AG RESP QL IA.RAPID: NEGATIVE

## 2025-03-27 PROCEDURE — 87804 INFLUENZA ASSAY W/OPTIC: CPT | Performed by: EMERGENCY MEDICINE

## 2025-03-27 PROCEDURE — 99283 EMERGENCY DEPT VISIT LOW MDM: CPT | Performed by: EMERGENCY MEDICINE

## 2025-03-27 PROCEDURE — 99282 EMERGENCY DEPT VISIT SF MDM: CPT

## 2025-03-27 PROCEDURE — 87811 SARS-COV-2 COVID19 W/OPTIC: CPT | Performed by: EMERGENCY MEDICINE

## 2025-03-27 PROCEDURE — 87651 STREP A DNA AMP PROBE: CPT | Performed by: EMERGENCY MEDICINE

## 2025-03-28 NOTE — ED PROVIDER NOTES
Time reflects when diagnosis was documented in both MDM as applicable and the Disposition within this note       Time User Action Codes Description Comment    3/27/2025  9:53 PM Jordy Dooley Add [J02.9] Pharyngitis           ED Disposition       ED Disposition   Discharge    Condition   Stable    Date/Time   Thu Mar 27, 2025  9:53 PM    Comment   Carlos WYATT Jimbo discharge to home/self care.                   Assessment & Plan       Medical Decision Making  Neck is supple.  Patient is nontoxic-appearing.  This is definitely not meningitis.  Most likely this is pharyngitis.  No evidence of peritonsillar abscess, retropharyngeal abscess, Jean Carlos's angina, angioedema, epiglottitis, or other more severe causes of sore throat.  Flu/COVID sent.  Strep sent.  Patient will be called if the results are positive.  Suspect viral etiology.    Amount and/or Complexity of Data Reviewed  Labs: ordered.    Risk  Decision regarding hospitalization.             Medications - No data to display    ED Risk Strat Scores                            SBIRT 20yo+      Flowsheet Row Most Recent Value   Initial Alcohol Screen: US AUDIT-C     1. How often do you have a drink containing alcohol? 0 Filed at: 03/27/2025 2153   2. How many drinks containing alcohol do you have on a typical day you are drinking?  0 Filed at: 03/27/2025 2153   3a. Male UNDER 65: How often do you have five or more drinks on one occasion? 0 Filed at: 03/27/2025 2153   3b. FEMALE Any Age, or MALE 65+: How often do you have 4 or more drinks on one occassion? 0 Filed at: 03/27/2025 2153   Audit-C Score 0 Filed at: 03/27/2025 2153   JUAN CARLOS: How many times in the past year have you...    Used an illegal drug or used a prescription medication for non-medical reasons? Never Filed at: 03/27/2025 2153                            History of Present Illness       Chief Complaint   Patient presents with    Sore Throat     Reports sore throat when she woke up; reports sides of neck  and back of head hurts with swallowing. No meds PTA.        Past Medical History:   Diagnosis Date    ADHD (attention deficit hyperactivity disorder)     Fainting spell     Low blood pressure     Migraine       History reviewed. No pertinent surgical history.   History reviewed. No pertinent family history.   Social History     Tobacco Use    Smoking status: Never    Smokeless tobacco: Never   Substance Use Topics    Alcohol use: No    Drug use: No      E-Cigarette/Vaping      E-Cigarette/Vaping Substances      I have reviewed and agree with the history as documented.     This is a 24-year-old female.  She presents to the emergency room with 1 day history of sore throat.  She has some anterior and posterior neck discomfort.  She has some headache.  No fever.  She is able to swallow.  No difficulty breathing.  No cough.  She does have nasal congestion.  She is worried and wants to make sure she does not have meningitis.  She does report a fever at home.        Review of Systems   Constitutional:  Positive for fever. Negative for chills.   HENT:  Positive for congestion and sore throat. Negative for rhinorrhea.    Eyes:  Negative for pain, redness and visual disturbance.   Respiratory:  Negative for cough and shortness of breath.    Cardiovascular:  Negative for chest pain and leg swelling.   Gastrointestinal:  Negative for abdominal pain, diarrhea and vomiting.   Endocrine: Negative for polydipsia and polyuria.   Genitourinary:  Negative for dysuria, frequency, hematuria, vaginal bleeding and vaginal discharge.   Musculoskeletal:  Positive for neck pain. Negative for back pain.   Skin:  Negative for rash and wound.   Allergic/Immunologic: Negative for immunocompromised state.   Neurological:  Positive for headaches. Negative for weakness and numbness.   Hematological:  Does not bruise/bleed easily.   Psychiatric/Behavioral:  Negative for hallucinations and suicidal ideas.    All other systems reviewed and are  negative.          Objective       ED Triage Vitals [03/27/25 2126]   Temperature Pulse Blood Pressure Respirations SpO2 Patient Position - Orthostatic VS   97.8 °F (36.6 °C) 100 142/79 20 99 % Sitting      Temp Source Heart Rate Source BP Location FiO2 (%) Pain Score    Oral Monitor Right arm -- --      Vitals      Date and Time Temp Pulse SpO2 Resp BP Pain Score FACES Pain Rating User   03/27/25 2126 97.8 °F (36.6 °C) 100 99 % 20 142/79 -- -- JM            Physical Exam  Vitals reviewed.   Constitutional:       General: She is not in acute distress.  HENT:      Head: Normocephalic and atraumatic.      Nose: Nose normal. No rhinorrhea.      Mouth/Throat:      Mouth: Mucous membranes are moist.      Pharynx: Oropharynx is clear. No oropharyngeal exudate or posterior oropharyngeal erythema.   Eyes:      General: No scleral icterus.        Right eye: No discharge.         Left eye: No discharge.      Conjunctiva/sclera: Conjunctivae normal.   Cardiovascular:      Rate and Rhythm: Normal rate and regular rhythm.      Pulses: Normal pulses.      Heart sounds: Normal heart sounds. No murmur heard.     No friction rub. No gallop.   Pulmonary:      Effort: Pulmonary effort is normal. No respiratory distress.      Breath sounds: Normal breath sounds. No stridor. No wheezing, rhonchi or rales.   Abdominal:      General: Bowel sounds are normal. There is no distension.      Palpations: Abdomen is soft.      Tenderness: There is no abdominal tenderness. There is no right CVA tenderness, left CVA tenderness, guarding or rebound.   Musculoskeletal:         General: No swelling, tenderness, deformity or signs of injury. Normal range of motion.      Cervical back: Normal range of motion and neck supple. No rigidity or tenderness.      Right lower leg: No edema.      Left lower leg: No edema.      Comments: No calf tenderness or unilateral leg swelling.   Lymphadenopathy:      Cervical: No cervical adenopathy.   Skin:     General:  Skin is warm and dry.      Coloration: Skin is not jaundiced.      Findings: No rash.   Neurological:      General: No focal deficit present.      Mental Status: She is alert and oriented to person, place, and time.      Sensory: No sensory deficit.      Motor: Motor function is intact.   Psychiatric:         Mood and Affect: Mood normal.         Behavior: Behavior normal.         Results Reviewed       Procedure Component Value Units Date/Time    FLU/COVID Rapid Antigen (30 min. TAT) - Preferred screening test in ED [[id number]]     Lab Status: No result Specimen: Nares from Nose     Strep A PCR [[id number]]     Lab Status: No result Specimen: Throat             No orders to display       Procedures    ED Medication and Procedure Management   Prior to Admission Medications   Prescriptions Last Dose Informant Patient Reported? Taking?   fluvoxaMINE (LUVOX) 100 mg tablet   Yes No   Sig: Take 200 mg by mouth daily.   guanFACINE (TENEX) 2 MG tablet   Yes No   Sig: Take 2 mg by mouth daily.   lidocaine (LMX) 4 % cream   No No   Sig: Apply topically as needed for mild pain   lisdexamfetamine (VYVANSE) 50 MG capsule  Self Yes No   Sig: Take 50 mg by mouth every morning   loratadine (CLARITIN) 10 mg tablet   No No   Sig: Take 1 tablet (10 mg total) by mouth 2 (two) times a day for 5 days   nitrofurantoin (MACROBID) 100 mg capsule   No No   Sig: Take 1 capsule (100 mg total) by mouth 2 (two) times a day   triamcinolone (KENALOG) 0.1 % cream   No No   Sig: Apply topically 2 (two) times a day      Facility-Administered Medications: None     Patient's Medications   Discharge Prescriptions    No medications on file     No discharge procedures on file.  ED SEPSIS DOCUMENTATION   Time reflects when diagnosis was documented in both MDM as applicable and the Disposition within this note       Time User Action Codes Description Comment    3/27/2025  9:53 PM Jordy Dooley Add [J02.9] Pharyngitis                  Jordy Dooley  MD  03/27/25 6869